# Patient Record
Sex: FEMALE | Race: WHITE | NOT HISPANIC OR LATINO | Employment: OTHER | URBAN - METROPOLITAN AREA
[De-identification: names, ages, dates, MRNs, and addresses within clinical notes are randomized per-mention and may not be internally consistent; named-entity substitution may affect disease eponyms.]

---

## 2022-09-30 ENCOUNTER — HOSPITAL ENCOUNTER (OUTPATIENT)
Dept: RADIOLOGY | Facility: HOSPITAL | Age: 62
Discharge: HOME | End: 2022-09-30
Attending: INTERNAL MEDICINE
Payer: COMMERCIAL

## 2022-09-30 DIAGNOSIS — Z12.31 BREAST CANCER SCREENING BY MAMMOGRAM: ICD-10-CM

## 2022-09-30 PROCEDURE — 77063 BREAST TOMOSYNTHESIS BI: CPT | Mod: 26,,, | Performed by: STUDENT IN AN ORGANIZED HEALTH CARE EDUCATION/TRAINING PROGRAM

## 2022-09-30 PROCEDURE — 77067 SCR MAMMO BI INCL CAD: CPT | Mod: TC

## 2022-09-30 PROCEDURE — 77067 SCR MAMMO BI INCL CAD: CPT | Mod: 26,,, | Performed by: STUDENT IN AN ORGANIZED HEALTH CARE EDUCATION/TRAINING PROGRAM

## 2022-09-30 PROCEDURE — 77063 MAMMO DIGITAL SCREENING BILAT WITH TOMO: ICD-10-PCS | Mod: 26,,, | Performed by: STUDENT IN AN ORGANIZED HEALTH CARE EDUCATION/TRAINING PROGRAM

## 2022-09-30 PROCEDURE — 77067 MAMMO DIGITAL SCREENING BILAT WITH TOMO: ICD-10-PCS | Mod: 26,,, | Performed by: STUDENT IN AN ORGANIZED HEALTH CARE EDUCATION/TRAINING PROGRAM

## 2022-11-16 ENCOUNTER — OFFICE VISIT (OUTPATIENT)
Dept: URGENT CARE | Facility: CLINIC | Age: 62
End: 2022-11-16
Payer: COMMERCIAL

## 2022-11-16 VITALS
RESPIRATION RATE: 18 BRPM | WEIGHT: 130 LBS | HEART RATE: 105 BPM | TEMPERATURE: 98 F | HEIGHT: 68 IN | SYSTOLIC BLOOD PRESSURE: 122 MMHG | BODY MASS INDEX: 19.7 KG/M2 | OXYGEN SATURATION: 98 % | DIASTOLIC BLOOD PRESSURE: 66 MMHG

## 2022-11-16 DIAGNOSIS — R51.9 ACUTE NONINTRACTABLE HEADACHE, UNSPECIFIED HEADACHE TYPE: ICD-10-CM

## 2022-11-16 DIAGNOSIS — U07.1 LAB TEST POSITIVE FOR DETECTION OF COVID-19 VIRUS: Primary | ICD-10-CM

## 2022-11-16 LAB
CTP QC/QA: YES
CTP QC/QA: YES
FLUAV AG NPH QL: NEGATIVE
FLUBV AG NPH QL: NEGATIVE
SARS-COV-2 RDRP RESP QL NAA+PROBE: POSITIVE

## 2022-11-16 PROCEDURE — 99213 OFFICE O/P EST LOW 20 MIN: CPT | Mod: S$PBB,CR,25, | Performed by: FAMILY MEDICINE

## 2022-11-16 PROCEDURE — 87635: ICD-10-PCS | Mod: QW,CR,, | Performed by: FAMILY MEDICINE

## 2022-11-16 PROCEDURE — 96372 PR INJECTION,THERAP/PROPH/DIAG2ST, IM OR SUBCUT: ICD-10-PCS | Mod: CR,S$PBB,, | Performed by: FAMILY MEDICINE

## 2022-11-16 PROCEDURE — 87635 SARS-COV-2 COVID-19 AMP PRB: CPT | Mod: QW,CR,, | Performed by: FAMILY MEDICINE

## 2022-11-16 PROCEDURE — 99213 PR OFFICE/OUTPT VISIT, EST, LEVL III, 20-29 MIN: ICD-10-PCS | Mod: S$PBB,CR,25, | Performed by: FAMILY MEDICINE

## 2022-11-16 PROCEDURE — 96372 THER/PROPH/DIAG INJ SC/IM: CPT | Mod: CR,S$PBB,, | Performed by: FAMILY MEDICINE

## 2022-11-16 PROCEDURE — 87804 POCT INFLUENZA A/B: ICD-10-PCS | Mod: QW,,, | Performed by: FAMILY MEDICINE

## 2022-11-16 PROCEDURE — 87804 INFLUENZA ASSAY W/OPTIC: CPT | Mod: QW,,, | Performed by: FAMILY MEDICINE

## 2022-11-16 RX ORDER — NIRMATRELVIR AND RITONAVIR 300-100 MG
KIT ORAL
Qty: 30 TABLET | Refills: 0 | Status: SHIPPED | OUTPATIENT
Start: 2022-11-16 | End: 2023-11-29

## 2022-11-16 RX ORDER — BETAMETHASONE SODIUM PHOSPHATE AND BETAMETHASONE ACETATE 3; 3 MG/ML; MG/ML
9 INJECTION, SUSPENSION INTRA-ARTICULAR; INTRALESIONAL; INTRAMUSCULAR; SOFT TISSUE
Status: COMPLETED | OUTPATIENT
Start: 2022-11-16 | End: 2022-11-16

## 2022-11-16 RX ORDER — KETOROLAC TROMETHAMINE 30 MG/ML
30 INJECTION, SOLUTION INTRAMUSCULAR; INTRAVENOUS
Status: COMPLETED | OUTPATIENT
Start: 2022-11-16 | End: 2022-11-16

## 2022-11-16 RX ORDER — BUTALBITAL, ACETAMINOPHEN AND CAFFEINE 50; 325; 40 MG/1; MG/1; MG/1
1 TABLET ORAL EVERY 6 HOURS PRN
Qty: 20 TABLET | Refills: 0 | Status: SHIPPED | OUTPATIENT
Start: 2022-11-16 | End: 2022-11-21

## 2022-11-16 RX ADMIN — BETAMETHASONE SODIUM PHOSPHATE AND BETAMETHASONE ACETATE 9 MG: 3; 3 INJECTION, SUSPENSION INTRA-ARTICULAR; INTRALESIONAL; INTRAMUSCULAR; SOFT TISSUE at 09:11

## 2022-11-16 RX ADMIN — KETOROLAC TROMETHAMINE 30 MG: 30 INJECTION, SOLUTION INTRAMUSCULAR; INTRAVENOUS at 09:11

## 2022-11-16 NOTE — PROGRESS NOTES
Subjective:       Patient ID: Yasmin Andrade is a 62 y.o. female.    Vitals:  vitals were not taken for this visit.     Chief Complaint: No chief complaint on file.    Cough, fever 102, headache, back pain, patients O2 was 89 last night, and sore throat. Started yesterday  ROS    Objective:      Physical Exam      Assessment:       No diagnosis found.      Plan:         There are no diagnoses linked to this encounter.

## 2022-11-16 NOTE — PROGRESS NOTES
Patient ID: [id number]     Chief Complaint: upper respiratory tract infection symptoms    History of Present Illness:     Yasmin Andrade is a 62 y.o. female  who presents today for symptoms of Cough  The history of asthma comes in today with a 1 day history of a cough, headache, and increased use of her albuterol rescue inhaler.  She reports that, when she is well, she still uses the rescue inhaler twice a day.  She has used it several more times since yesterday.  She also had an albuterol nebulizer this morning because she reports that her saturations were 89%.  She presents today with her daughter.    During exam, she is not in acute distress, she does not have any increased work of breathing.    Pt denies experiencing any fevers, chills, nausea, vomiting, difficulty breathing, dysphagia, or neck stiffness.    Past Medical History:     ----------------------------  Asthma  Hypothyroidism  Migraine     Past Surgical History:   Procedure Laterality Date     SECTION      right shoulder sx      sinus sx         Review of patient's allergies indicates:   Allergen Reactions    Penicillins Hives    Levofloxacin     Penicillanic sulfone bl beta-lactamase inhibitors Edema       Outpatient Medications Marked as Taking for the 22 encounter (Office Visit) [place] [facility] [facility] [place]ication Sig Dispense Refill    BREO ELLIPTA 200-25 mcg/dose DsDv diskus inhaler INHALE 1 PUFF BY MOUTH ONCE A DAY      levothyroxine (SYNTHROID) 50 MCG tablet Take 50 mcg by mouth once daily.      montelukast (SINGULAIR) 10 mg tablet Take 10 mg by mouth once daily.      SPIRIVA RESPIMAT 1.25 mcg/actuation inhaler INHALE TWO PUFFS ONCE A DAY      topiramate (TOPAMAX ORAL) Topamax Take No date recorded No form recorded No frequency recorded No route recorded No set duration recorded No set duration amount recorded active No dosage strength recorded No dosage strength units of measure recorded      topiramate (TOPAMAX) 50 MG  "tablet Take 50 mg by mouth once daily.       Current Facility-Administered Medications for the 11/16/22 encounter (Office Visit) with [facility], [facility] [place]   Medication Dose Route Frequency Provider Last Rate Last Admin    betamethasone acetate-betamethasone sodium phosphate injection 9 mg  9 mg Intramuscular 1 time in Clinic/HOD Castro Torres MD        ketorolac injection 30 mg  30 mg Intramuscular 1 time in Clinic/HOD Castro Torres MD           Social History     Socioeconomic History    Marital status:    Tobacco Use    Smoking status: Never    Smokeless tobacco: Never   Substance and Sexual Activity    Alcohol use: Never        Family History   Family history unknown: Yes        Subjective:     Review of Systems   Constitutional:  Positive for fever. Negative for chills, malaise/fatigue and weight loss.   HENT:  Positive for sore throat. Negative for congestion.    Respiratory:  Positive for cough. Negative for sputum production, shortness of breath, wheezing and stridor.    Gastrointestinal:  Negative for abdominal pain, diarrhea, nausea and vomiting.   Musculoskeletal:  Positive for back pain. Negative for myalgias and neck pain.   Neurological:  Positive for headaches.     Objective:     /66   Pulse 105   Temp 98.4 °F (36.9 °C) (Oral)   Resp 18   Ht 5' 8" (1.727 m)   Wt 59 kg (130 lb)   SpO2 98%   BMI 19.77 kg/m²     Physical Exam  Vitals and nursing note reviewed.   Constitutional:       General: She is not in acute distress.     Appearance: Normal appearance. She is normal weight. She is not ill-appearing or toxic-appearing.   HENT:      Head: Normocephalic and atraumatic.      Right Ear: Tympanic membrane and ear canal normal.      Left Ear: Tympanic membrane and ear canal normal.      Nose: Nose normal. No congestion or rhinorrhea.      Comments: There are several red non blanchable spots on her soft palate.  She reports that she is been told this before.  Recommended she " follow-up with her PCP or ENT about this     Mouth/Throat:      Pharynx: No oropharyngeal exudate or posterior oropharyngeal erythema.   Eyes:      General: No scleral icterus.        Right eye: No discharge.         Left eye: No discharge.      Extraocular Movements: Extraocular movements intact.      Conjunctiva/sclera: Conjunctivae normal.   Cardiovascular:      Rate and Rhythm: Normal rate and regular rhythm.      Heart sounds: Normal heart sounds. No murmur heard.    No friction rub. No gallop.   Pulmonary:      Effort: Pulmonary effort is normal. No respiratory distress.      Breath sounds: No stridor. No wheezing, rhonchi or rales.      Comments: Decreased lung sounds all fields.  No increased work of breathing, no wheezing,  Musculoskeletal:      Cervical back: Normal range of motion. No rigidity or tenderness.   Lymphadenopathy:      Cervical: No cervical adenopathy.   Neurological:      Mental Status: She is alert.   Psychiatric:         Mood and Affect: Mood normal.         Behavior: Behavior normal.       Assessment & Plan:       ICD-10-CM ICD-9-CM   1. Lab test positive for detection of COVID-19 virus  U07.1 079.89   2. Acute nonintractable headache, unspecified headache type  R51.9 784.0      We had a long discussion about my concern for her COVID diagnosis in the setting of asthma.  Discussed with her and her daughter to have a hair trigger to go to the emergency room if she has increased work of breathing that does not respond to reasonable albuterol use.  They both voiced understanding.  We will get a chest x-ray for a baseline in the event her condition worsens.  She asked for something for the headache addition to the Toradol shot so we will give Fioricet.  She already has an immunologist/allergist who is working on her asthma meds.  She will give that physician on call to see they have any further recommendations.     1. Lab test positive for detection of COVID-19 virus  -     POCT Influenza  A/B  -     POCT COVID-19 Rapid Screening  -     betamethasone acetate-betamethasone sodium phosphate injection 9 mg  -     X-Ray Chest PA And Lateral; Future; Expected date: 11/16/2022  -     nirmatrelvir-ritonavir (PAXLOVID, EUA,) 300 mg (150 mg x 2)-100 mg copackaged tablets (EUA); Take 3 tablets by mouth 2 (two) times daily. Each dose contains 2 nirmatrelvir (pink tablets) and 1 ritonavir (white tablet). Take all 3 tablets together  Dispense: 30 tablet; Refill: 0    2. Acute nonintractable headache, unspecified headache type  -     betamethasone acetate-betamethasone sodium phosphate injection 9 mg  -     ketorolac injection 30 mg  -     butalbital-acetaminophen-caffeine -40 mg (FIORICET, ESGIC) -40 mg per tablet; Take 1 tablet by mouth every 6 (six) hours as needed for Headaches.  Dispense: 20 tablet; Refill: 0

## 2023-02-22 ENCOUNTER — OFFICE VISIT (OUTPATIENT)
Dept: URGENT CARE | Facility: CLINIC | Age: 63
End: 2023-02-22
Payer: COMMERCIAL

## 2023-02-22 VITALS
DIASTOLIC BLOOD PRESSURE: 87 MMHG | TEMPERATURE: 98 F | HEIGHT: 68 IN | RESPIRATION RATE: 18 BRPM | WEIGHT: 130 LBS | OXYGEN SATURATION: 97 % | HEART RATE: 104 BPM | BODY MASS INDEX: 19.7 KG/M2 | SYSTOLIC BLOOD PRESSURE: 142 MMHG

## 2023-02-22 DIAGNOSIS — R05.9 COUGH, UNSPECIFIED TYPE: Primary | ICD-10-CM

## 2023-02-22 PROCEDURE — 99213 PR OFFICE/OUTPT VISIT, EST, LEVL III, 20-29 MIN: ICD-10-PCS | Mod: ,,, | Performed by: PHYSICIAN ASSISTANT

## 2023-02-22 PROCEDURE — 99213 OFFICE O/P EST LOW 20 MIN: CPT | Mod: ,,, | Performed by: PHYSICIAN ASSISTANT

## 2023-02-22 RX ORDER — DOXYCYCLINE 100 MG/1
100 CAPSULE ORAL EVERY 12 HOURS
Qty: 20 CAPSULE | Refills: 0 | Status: SHIPPED | OUTPATIENT
Start: 2023-02-22 | End: 2023-03-04

## 2023-02-22 RX ORDER — PREDNISONE 20 MG/1
20 TABLET ORAL 2 TIMES DAILY
Qty: 10 TABLET | Refills: 0 | Status: SHIPPED | OUTPATIENT
Start: 2023-02-22 | End: 2023-02-27

## 2023-02-23 NOTE — PROGRESS NOTES
"Subjective:       Patient ID: Yasmin Andrade is a 62 y.o. female.    Vitals:  height is 5' 8" (1.727 m) and weight is 59 kg (130 lb). Her temperature is 97.7 °F (36.5 °C). Her blood pressure is 142/87 (abnormal) and her pulse is 104. Her respiration is 18 and oxygen saturation is 97%.     Chief Complaint: Cough (Cough, congestion,  sore throat x2 weeks. Mucinex and Tylenol taken 11am, no relief.)    Cough, congestion, sore throat x2 weeks. Mucinex and Tylenol taken 11am, no relief.  Patient does have a history asthma and has been using breathing treatments to alleviate wheezing.  Denies any fever.    Cough    Respiratory:  Positive for cough.      Objective:      Physical Exam   Constitutional: She is oriented to person, place, and time. She appears well-developed. She is cooperative.  Non-toxic appearance. She does not appear ill. No distress.   HENT:   Head: Normocephalic and atraumatic.   Ears:   Right Ear: Hearing, tympanic membrane, external ear and ear canal normal.   Left Ear: Hearing, tympanic membrane, external ear and ear canal normal.   Nose: Nose normal. No nasal deformity. No epistaxis.   Mouth/Throat: Uvula is midline, oropharynx is clear and moist and mucous membranes are normal. No trismus in the jaw. Normal dentition. No uvula swelling. No oropharyngeal exudate, posterior oropharyngeal edema or posterior oropharyngeal erythema.   Eyes: Conjunctivae and lids are normal. No scleral icterus.   Neck: Trachea normal and phonation normal. Neck supple. No edema present. No erythema present. No neck rigidity present.   Cardiovascular: Normal rate, regular rhythm, normal heart sounds and normal pulses.   Pulmonary/Chest: Effort normal and breath sounds normal. No respiratory distress. She has no decreased breath sounds. She has no rhonchi.   Abdominal: Normal appearance.   Musculoskeletal: Normal range of motion.         General: No deformity. Normal range of motion.   Neurological: She is alert and oriented to " person, place, and time. She exhibits normal muscle tone. Coordination normal.   Skin: Skin is warm, dry, intact, not diaphoretic and not pale.   Psychiatric: Her speech is normal and behavior is normal. Judgment and thought content normal.   Nursing note and vitals reviewed.             Previous History      Review of patient's allergies indicates:   Allergen Reactions    Penicillins Hives    Levofloxacin     Penicillanic sulfone bl beta-lactamase inhibitors Edema       Past Medical History:   Diagnosis Date    Asthma     Hypothyroidism     Migraine      Current Outpatient Medications   Medication Instructions    azithromycin (ZITHROMAX) 500 mg, Oral, Daily    BREO ELLIPTA 200-25 mcg/dose DsDv diskus inhaler INHALE 1 PUFF BY MOUTH ONCE A DAY    doxycycline (MONODOX) 100 mg, Oral, Every 12 hours    FASENRA PEN 30 mg/mL AtIn Subcutaneous    levothyroxine (SYNTHROID) 50 mcg, Oral, Daily    montelukast (SINGULAIR) 10 mg, Oral, Daily    nirmatrelvir-ritonavir (PAXLOVID, EUA,) 300 mg (150 mg x 2)-100 mg copackaged tablets (EUA) Take 3 tablets by mouth 2 (two) times daily. Each dose contains 2 nirmatrelvir (pink tablets) and 1 ritonavir (white tablet). Take all 3 tablets together    predniSONE (DELTASONE) 20 mg, Oral, 2 times daily    pyrilamine-chlophedianoL 12.5-12.5 mg/5 mL Liqd 10 mLs, Oral, 3 times daily    SPIRIVA RESPIMAT 1.25 mcg/actuation inhaler INHALE TWO PUFFS ONCE A DAY    topiramate (TOPAMAX ORAL) Topamax Take No date recorded No form recorded No frequency recorded No route recorded No set duration recorded No set duration amount recorded active No dosage strength recorded No dosage strength units of measure recorded    topiramate (TOPAMAX) 50 mg, Oral, Daily     Past Surgical History:   Procedure Laterality Date     SECTION      right shoulder sx      sinus sx       Family History   Problem Relation Age of Onset    No Known Problems Mother     No Known Problems Father     No Known Problems Sister   "   No Known Problems Brother        Social History     Tobacco Use    Smoking status: Never    Smokeless tobacco: Never   Substance Use Topics    Alcohol use: Never    Drug use: Never        Physical Exam      Vital Signs Reviewed   BP (!) 142/87   Pulse 104   Temp 97.7 °F (36.5 °C)   Resp 18   Ht 5' 8" (1.727 m)   Wt 59 kg (130 lb)   SpO2 97%   BMI 19.77 kg/m²        Procedures    Procedures     Labs     Results for orders placed or performed in visit on 11/16/22   POCT Influenza A/B   Result Value Ref Range    Rapid Influenza A Ag Negative Negative    Rapid Influenza B Ag Negative Negative     Acceptable Yes    POCT COVID-19 Rapid Screening   Result Value Ref Range    POC Rapid COVID Positive (A) Negative     Acceptable Yes      Assessment:       1. Cough, unspecified type          Plan:         Cough, unspecified type    Other orders  -     doxycycline (MONODOX) 100 MG capsule; Take 1 capsule (100 mg total) by mouth every 12 (twelve) hours. for 10 days  Dispense: 20 capsule; Refill: 0  -     predniSONE (DELTASONE) 20 MG tablet; Take 1 tablet (20 mg total) by mouth 2 (two) times daily. for 5 days  Dispense: 10 tablet; Refill: 0  -     pyrilamine-chlophedianoL 12.5-12.5 mg/5 mL Liqd; Take 10 mLs by mouth 3 (three) times daily.  Dispense: 180 mL; Refill: 0    Drink plenty of fluids.     Get plenty of rest.     Tylenol or Motrin as needed.     Go to the ER with any significant change or worsening of symptoms.     Follow up with your primary care doctor.                    "

## 2023-05-03 ENCOUNTER — LAB VISIT (OUTPATIENT)
Dept: LAB | Facility: HOSPITAL | Age: 63
End: 2023-05-03
Attending: INTERNAL MEDICINE
Payer: COMMERCIAL

## 2023-05-03 DIAGNOSIS — R63.4 LOSS OF WEIGHT: Primary | ICD-10-CM

## 2023-05-03 DIAGNOSIS — K31.89 GASTROPTOSIS: ICD-10-CM

## 2023-05-03 LAB — IGA SERPL-MCNC: 175 MG/DL (ref 69–517)

## 2023-05-03 PROCEDURE — 36415 COLL VENOUS BLD VENIPUNCTURE: CPT

## 2023-05-03 PROCEDURE — 83516 IMMUNOASSAY NONANTIBODY: CPT

## 2023-05-03 PROCEDURE — 82784 ASSAY IGA/IGD/IGG/IGM EACH: CPT

## 2023-05-03 PROCEDURE — 86364 TISS TRNSGLTMNASE EA IG CLAS: CPT

## 2023-05-05 LAB
ELIA CELIKEY IGA (TTG IGA): NEGATIVE
ELIA CELIKEY IGG (TTG IGG): NEGATIVE
GLIADIN IGA DEAMINATED (OHS): NEGATIVE UNIT/ML
GLIADIN IGG DEAMINATED (OHS): NEGATIVE

## 2023-10-02 ENCOUNTER — OFFICE VISIT (OUTPATIENT)
Dept: URGENT CARE | Facility: CLINIC | Age: 63
End: 2023-10-02
Payer: COMMERCIAL

## 2023-10-02 VITALS
DIASTOLIC BLOOD PRESSURE: 87 MMHG | OXYGEN SATURATION: 98 % | BODY MASS INDEX: 20.92 KG/M2 | WEIGHT: 138 LBS | RESPIRATION RATE: 18 BRPM | SYSTOLIC BLOOD PRESSURE: 147 MMHG | HEIGHT: 68 IN | TEMPERATURE: 98 F | HEART RATE: 86 BPM

## 2023-10-02 DIAGNOSIS — N30.00 ACUTE CYSTITIS WITHOUT HEMATURIA: ICD-10-CM

## 2023-10-02 DIAGNOSIS — R05.9 COUGH, UNSPECIFIED TYPE: Primary | ICD-10-CM

## 2023-10-02 DIAGNOSIS — J18.9 PNEUMONIA OF RIGHT UPPER LOBE DUE TO INFECTIOUS ORGANISM: ICD-10-CM

## 2023-10-02 LAB
BILIRUB UR QL STRIP: NEGATIVE
CTP QC/QA: YES
CTP QC/QA: YES
GLUCOSE UR QL STRIP: NEGATIVE
KETONES UR QL STRIP: NEGATIVE
LEUKOCYTE ESTERASE UR QL STRIP: POSITIVE
PH, POC UA: 5
POC BLOOD, URINE: NEGATIVE
POC MOLECULAR INFLUENZA A AGN: NEGATIVE
POC MOLECULAR INFLUENZA B AGN: NEGATIVE
POC NITRATES, URINE: NEGATIVE
PROT UR QL STRIP: NEGATIVE
SARS-COV-2 RDRP RESP QL NAA+PROBE: NEGATIVE
SP GR UR STRIP: 1.02 (ref 1–1.03)
UROBILINOGEN UR STRIP-ACNC: 0.1 (ref 0.1–1.1)

## 2023-10-02 PROCEDURE — 87502 POCT INFLUENZA A/B MOLECULAR: ICD-10-PCS | Mod: QW,,,

## 2023-10-02 PROCEDURE — 87635 SARS-COV-2 COVID-19 AMP PRB: CPT | Mod: QW,,,

## 2023-10-02 PROCEDURE — 81003 POCT URINALYSIS, DIPSTICK, AUTOMATED, W/O SCOPE: ICD-10-PCS | Mod: QW,,,

## 2023-10-02 PROCEDURE — 87502 INFLUENZA DNA AMP PROBE: CPT | Mod: QW,,,

## 2023-10-02 PROCEDURE — 87088 URINE BACTERIA CULTURE: CPT

## 2023-10-02 PROCEDURE — 87635: ICD-10-PCS | Mod: QW,,,

## 2023-10-02 PROCEDURE — 99214 OFFICE O/P EST MOD 30 MIN: CPT | Mod: ,,,

## 2023-10-02 PROCEDURE — 81003 URINALYSIS AUTO W/O SCOPE: CPT | Mod: QW,,,

## 2023-10-02 PROCEDURE — 99214 PR OFFICE/OUTPT VISIT, EST, LEVL IV, 30-39 MIN: ICD-10-PCS | Mod: ,,,

## 2023-10-02 RX ORDER — BENZONATATE 100 MG/1
100 CAPSULE ORAL 3 TIMES DAILY PRN
Qty: 15 CAPSULE | Refills: 0 | Status: SHIPPED | OUTPATIENT
Start: 2023-10-02 | End: 2023-10-07

## 2023-10-02 RX ORDER — LISINOPRIL 10 MG/1
10 TABLET ORAL
COMMUNITY
Start: 2023-07-10 | End: 2023-11-29

## 2023-10-02 RX ORDER — DOXYCYCLINE 100 MG/1
100 CAPSULE ORAL EVERY 12 HOURS
Qty: 14 CAPSULE | Refills: 0 | Status: SHIPPED | OUTPATIENT
Start: 2023-10-02 | End: 2023-10-09

## 2023-10-02 RX ORDER — ONDANSETRON 4 MG/1
4 TABLET, ORALLY DISINTEGRATING ORAL EVERY 8 HOURS PRN
Qty: 9 TABLET | Refills: 0 | Status: SHIPPED | OUTPATIENT
Start: 2023-10-02 | End: 2023-10-05

## 2023-10-02 RX ORDER — DOXYCYCLINE 100 MG/1
100 CAPSULE ORAL EVERY 12 HOURS
Qty: 14 CAPSULE | Refills: 0 | Status: SHIPPED | OUTPATIENT
Start: 2023-10-02 | End: 2023-10-02

## 2023-10-02 NOTE — PROGRESS NOTES
"Subjective:      Patient ID: Yasmin Andrade is a 62 y.o. female.    Vitals:  height is 5' 8" (1.727 m) and weight is 62.6 kg (138 lb). Her temperature is 98 °F (36.7 °C). Her blood pressure is 147/87 (abnormal) and her pulse is 86. Her respiration is 18 and oxygen saturation is 98%.     Chief Complaint: Cough (Fever(100.9* x last night)BA, vomiting, dry Cough, nausea, NC, HA, pressure lower stomach x4d tylenol, breathing treatments mild)    A 61 y/o female presents to the clinic with c/o dry cough, fever tmax 101, body aches, nausea and vomiting, and pelvic pressure x 3 days. She denies any urinary frequency, urgency or dysuria. She does have a hx of asthma but denies any wheezing, chest tightness or shortness of breath. She also denies any sore throat, diarrhea, rash, difficulty swallowing, neck stiffness, or changes in output.         Cough  Associated symptoms include chills and a fever. Pertinent negatives include no ear pain, hemoptysis, sore throat, shortness of breath or wheezing.       Constitution: Positive for chills, fatigue and fever.   HENT:  Positive for congestion and sinus pressure. Negative for ear pain, sore throat, trouble swallowing and voice change.    Respiratory:  Positive for cough, sputum production and asthma. Negative for bloody sputum, shortness of breath and wheezing.    Gastrointestinal:  Positive for nausea and vomiting. Negative for abdominal pain, constipation, diarrhea and bright red blood in stool.   Genitourinary:  Positive for pelvic pain. Negative for dysuria, frequency, urgency, urine decreased and flank pain.   Allergic/Immunologic: Positive for asthma.      Objective:     Physical Exam   Constitutional: She is oriented to person, place, and time. She appears well-developed. She is cooperative.  Non-toxic appearance. She does not appear ill. No distress.   HENT:   Head: Normocephalic and atraumatic.   Ears:   Right Ear: Hearing, tympanic membrane and external ear normal. "   Left Ear: Hearing, tympanic membrane and external ear normal.   Nose: Congestion (clear pnd) present.   Mouth/Throat: Mucous membranes are normal. Mucous membranes are moist. Posterior oropharyngeal erythema present. Oropharynx is clear.   Eyes: Conjunctivae and lids are normal.   Neck: Trachea normal and phonation normal. Neck supple. No edema present. No erythema present. No neck rigidity present.   Cardiovascular: Normal rate.   Pulmonary/Chest: Effort normal and breath sounds normal. No stridor. No respiratory distress. She has no decreased breath sounds. She has no wheezes. She has no rhonchi. She has no rales.   Abdominal: Normal appearance and bowel sounds are normal. Soft. flat abdomen There is abdominal tenderness in the suprapubic area. There is no rebound and no guarding.   Neurological: She is alert and oriented to person, place, and time. She exhibits normal muscle tone.   Skin: Skin is warm, dry, intact, not diaphoretic and no rash. Capillary refill takes less than 2 seconds.   Psychiatric: Her speech is normal and behavior is normal. Mood normal.   Nursing note and vitals reviewed.      Assessment:     1. Cough, unspecified type    2. Acute cystitis without hematuria    3. Pneumonia of right upper lobe due to infectious organism        Plan:       Cough, unspecified type  -     Cancel: POCT Strep A, Molecular  -     POCT COVID-19 Rapid Screening  -     POCT Influenza A/B MOLECULAR  -     X-Ray Chest PA And Lateral  -     POCT Urinalysis, Dipstick, Automated, W/O Scope    Acute cystitis without hematuria  -     Urine culture    Pneumonia of right upper lobe due to infectious organism    Other orders  -     Discontinue: doxycycline (MONODOX) 100 MG capsule; Take 1 capsule (100 mg total) by mouth every 12 (twelve) hours. for 7 days  Dispense: 14 capsule; Refill: 0  -     ondansetron (ZOFRAN-ODT) 4 MG TbDL; Take 1 tablet (4 mg total) by mouth every 8 (eight) hours as needed (nausea).  Dispense: 9  tablet; Refill: 0  -     benzonatate (TESSALON) 100 MG capsule; Take 1 capsule (100 mg total) by mouth 3 (three) times daily as needed for Cough.  Dispense: 15 capsule; Refill: 0    Covid, and flu negative   U/a: positive for 75 leukocytes  Chest xray:Right upper lung lobe mild infiltrates.   Orthostatic vital signs negative     Cough   Use you inhaler/nebulizer as directed for wheezing, coughing spells, or chest congestion   Start the antibiotic today take to completion   Start increasing your fluid intake with small sips of an oral re-hydration solution like Pedialyte or Gatorade, if you are unable to tolerate this you need to go to the ER for to rule out dehydration   STRICT ER precautions discussed, if you do not improve or worsen at at all including shortness of breath, wheezing, weakness or lethargy continue fever or vomiting go to the ER immediately  Call your pulmonologist for further recommendations on steroid use.    Start taking an allergy pill daily such as claritin, zyrtec, allegrea or xyzal. Also start using a nasal steroid spray such as flonase or nasacort daily. If you are not being treated for high blood pressure, you can also take decongestant such as sudafed as needed. They can be purchased over the counter. If oral steroids were prescribed, start them tomorrow morning. Monitor for fever. Take tylenol/acetaminophen or ibuprofen as needed. Rest and hydrate. If symptoms persist or worsen, return to clinic or seek medical attention immediately.     UTI   We will culture urine and call you with the results when they become available.  Monitor for fever. Always wipe from front to back. Avoid scented soaps or bubble baths. Increase your Hydration. Avoid sugary drinks, or caffeine as they can irritate your bladder.  If your symptoms persist or worsen or you develop fever back pain or belly pain return to clinic or seek medical attention immediately

## 2023-10-02 NOTE — PATIENT INSTRUCTIONS
Cough   Use you inhaler/nebulizer as directed for wheezing, coughing spells, or chest congestion   Start the antibiotic today take to completion   Start increasing your fluid intake with small sips of an oral re-hydration solution like Pedialyte or Gatorade, if you are unable to tolerate this you need to go to the ER for possible fluids  STRICT ER precautions discussed, if you do not improve or worsen at at all including shortness of breath, wheezing, weakness or lethargy continue fever or vomiting go to the ER immediately  Call your pulmonologist for further recommendations on steroid use.    Start taking an allergy pill daily such as claritin, zyrtec, allegrea or xyzal. Also start using a nasal steroid spray such as flonase or nasacort daily. If you are not being treated for high blood pressure, you can also take decongestant such as sudafed as needed. They can be purchased over the counter. If oral steroids were prescribed, start them tomorrow morning. Monitor for fever. Take tylenol/acetaminophen or ibuprofen as needed. Rest and hydrate. If symptoms persist or worsen, return to clinic or seek medical attention immediately.     UTI   We will culture urine and call you with the results when they become available.  Monitor for fever. Always wipe from front to back. Avoid scented soaps or bubble baths. Increase your Hydration. Avoid sugary drinks, or caffeine as they can irritate your bladder.  If your symptoms persist or worsen or you develop fever back pain or belly pain return to clinic or seek medical attention immediately

## 2023-10-03 ENCOUNTER — TELEPHONE (OUTPATIENT)
Dept: URGENT CARE | Facility: CLINIC | Age: 63
End: 2023-10-03
Payer: COMMERCIAL

## 2023-10-03 NOTE — TELEPHONE ENCOUNTER
Called to check on the status of the patient, she reports feeling slightly better but is not any worse. Instructed to follow up with her pcp and return to clinic as needed.

## 2023-10-04 LAB — BACTERIA UR CULT: NORMAL

## 2023-11-07 DIAGNOSIS — R79.89 ABNORMAL CBC: Primary | ICD-10-CM

## 2023-11-10 DIAGNOSIS — R13.14 DYSPHAGIA, PHARYNGOESOPHAGEAL PHASE: Primary | ICD-10-CM

## 2023-11-10 DIAGNOSIS — Z79.2 ENCOUNTER FOR LONG-TERM (CURRENT) USE OF ANTIBIOTICS: ICD-10-CM

## 2023-11-10 DIAGNOSIS — R11.0 NAUSEA: ICD-10-CM

## 2023-11-10 DIAGNOSIS — K86.2 CYST OF PANCREAS: ICD-10-CM

## 2023-11-10 DIAGNOSIS — K21.00 GASTRO-ESOPHAGEAL REFLUX DISEASE WITH ESOPHAGITIS: ICD-10-CM

## 2023-11-10 DIAGNOSIS — R07.9 CHEST PAIN, UNSPECIFIED: ICD-10-CM

## 2023-11-29 ENCOUNTER — OFFICE VISIT (OUTPATIENT)
Dept: HEMATOLOGY/ONCOLOGY | Facility: CLINIC | Age: 63
End: 2023-11-29
Payer: COMMERCIAL

## 2023-11-29 VITALS
OXYGEN SATURATION: 100 % | TEMPERATURE: 98 F | WEIGHT: 134.13 LBS | BODY MASS INDEX: 20.33 KG/M2 | HEART RATE: 67 BPM | SYSTOLIC BLOOD PRESSURE: 142 MMHG | RESPIRATION RATE: 18 BRPM | DIASTOLIC BLOOD PRESSURE: 87 MMHG | HEIGHT: 68 IN

## 2023-11-29 DIAGNOSIS — R79.89 ABNORMAL CBC: ICD-10-CM

## 2023-11-29 DIAGNOSIS — D72.10 EOSINOPHILIA, UNSPECIFIED TYPE: Primary | ICD-10-CM

## 2023-11-29 LAB
ALBUMIN SERPL-MCNC: 4.4 G/DL (ref 3.4–4.8)
ALBUMIN/GLOB SERPL: 1.8 RATIO (ref 1.1–2)
ALP SERPL-CCNC: 72 UNIT/L (ref 40–150)
ALT SERPL-CCNC: 15 UNIT/L (ref 0–55)
AST SERPL-CCNC: 15 UNIT/L (ref 5–34)
BASOPHILS # BLD AUTO: 0.04 X10(3)/MCL
BASOPHILS NFR BLD AUTO: 0.6 %
BILIRUB SERPL-MCNC: 0.5 MG/DL
BUN SERPL-MCNC: 9 MG/DL (ref 9.8–20.1)
CALCIUM SERPL-MCNC: 9.3 MG/DL (ref 8.4–10.2)
CHLORIDE SERPL-SCNC: 106 MMOL/L (ref 98–107)
CO2 SERPL-SCNC: 27 MMOL/L (ref 23–31)
CREAT SERPL-MCNC: 0.7 MG/DL (ref 0.55–1.02)
EOSINOPHIL # BLD AUTO: 0.78 X10(3)/MCL (ref 0–0.9)
EOSINOPHIL NFR BLD AUTO: 12.2 %
ERYTHROCYTE [DISTWIDTH] IN BLOOD BY AUTOMATED COUNT: 13.3 % (ref 11.5–17)
GFR SERPLBLD CREATININE-BSD FMLA CKD-EPI: >60 MLS/MIN/1.73/M2
GLOBULIN SER-MCNC: 2.4 GM/DL (ref 2.4–3.5)
GLUCOSE SERPL-MCNC: 94 MG/DL (ref 82–115)
HCT VFR BLD AUTO: 38.1 % (ref 37–47)
HGB BLD-MCNC: 11.7 G/DL (ref 12–16)
IMM GRANULOCYTES # BLD AUTO: 0 X10(3)/MCL (ref 0–0.04)
IMM GRANULOCYTES NFR BLD AUTO: 0 %
LYMPHOCYTES # BLD AUTO: 2.19 X10(3)/MCL (ref 0.6–4.6)
LYMPHOCYTES NFR BLD AUTO: 34.4 %
MCH RBC QN AUTO: 29.5 PG (ref 27–31)
MCHC RBC AUTO-ENTMCNC: 30.7 G/DL (ref 33–36)
MCV RBC AUTO: 96.2 FL (ref 80–94)
MONOCYTES # BLD AUTO: 0.43 X10(3)/MCL (ref 0.1–1.3)
MONOCYTES NFR BLD AUTO: 6.8 %
NEUTROPHILS # BLD AUTO: 2.93 X10(3)/MCL (ref 2.1–9.2)
NEUTROPHILS NFR BLD AUTO: 46 %
PLATELET # BLD AUTO: 288 X10(3)/MCL (ref 130–400)
PMV BLD AUTO: 8.4 FL (ref 7.4–10.4)
POTASSIUM SERPL-SCNC: 3.7 MMOL/L (ref 3.5–5.1)
PROT SERPL-MCNC: 6.8 GM/DL (ref 5.8–7.6)
RBC # BLD AUTO: 3.96 X10(6)/MCL (ref 4.2–5.4)
RHEUMATOID FACT SERPL-ACNC: 21 IU/ML
SODIUM SERPL-SCNC: 142 MMOL/L (ref 136–145)
VIT B12 SERPL-MCNC: 790 PG/ML (ref 213–816)
WBC # SPEC AUTO: 6.37 X10(3)/MCL (ref 4.5–11.5)

## 2023-11-29 PROCEDURE — 86431 RHEUMATOID FACTOR QUANT: CPT | Performed by: INTERNAL MEDICINE

## 2023-11-29 PROCEDURE — 99999 PR PBB SHADOW E&M-EST. PATIENT-LVL IV: ICD-10-PCS | Mod: PBBFAC,,, | Performed by: INTERNAL MEDICINE

## 2023-11-29 PROCEDURE — 3077F PR MOST RECENT SYSTOLIC BLOOD PRESSURE >= 140 MM HG: ICD-10-PCS | Mod: CPTII,S$GLB,, | Performed by: INTERNAL MEDICINE

## 2023-11-29 PROCEDURE — 3079F PR MOST RECENT DIASTOLIC BLOOD PRESSURE 80-89 MM HG: ICD-10-PCS | Mod: CPTII,S$GLB,, | Performed by: INTERNAL MEDICINE

## 2023-11-29 PROCEDURE — 85025 COMPLETE CBC W/AUTO DIFF WBC: CPT | Performed by: INTERNAL MEDICINE

## 2023-11-29 PROCEDURE — 83520 IMMUNOASSAY QUANT NOS NONAB: CPT | Performed by: INTERNAL MEDICINE

## 2023-11-29 PROCEDURE — 3044F PR MOST RECENT HEMOGLOBIN A1C LEVEL <7.0%: ICD-10-PCS | Mod: CPTII,S$GLB,, | Performed by: INTERNAL MEDICINE

## 2023-11-29 PROCEDURE — 1160F RVW MEDS BY RX/DR IN RCRD: CPT | Mod: CPTII,S$GLB,, | Performed by: INTERNAL MEDICINE

## 2023-11-29 PROCEDURE — 3077F SYST BP >= 140 MM HG: CPT | Mod: CPTII,S$GLB,, | Performed by: INTERNAL MEDICINE

## 2023-11-29 PROCEDURE — 3044F HG A1C LEVEL LT 7.0%: CPT | Mod: CPTII,S$GLB,, | Performed by: INTERNAL MEDICINE

## 2023-11-29 PROCEDURE — 86431 RHEUMATOID FACTOR QUANT: CPT | Mod: 91 | Performed by: INTERNAL MEDICINE

## 2023-11-29 PROCEDURE — 1160F PR REVIEW ALL MEDS BY PRESCRIBER/CLIN PHARMACIST DOCUMENTED: ICD-10-PCS | Mod: CPTII,S$GLB,, | Performed by: INTERNAL MEDICINE

## 2023-11-29 PROCEDURE — 82787 IGG 1 2 3 OR 4 EACH: CPT | Performed by: INTERNAL MEDICINE

## 2023-11-29 PROCEDURE — 82607 VITAMIN B-12: CPT | Performed by: INTERNAL MEDICINE

## 2023-11-29 PROCEDURE — 86039 ANTINUCLEAR ANTIBODIES (ANA): CPT | Performed by: INTERNAL MEDICINE

## 2023-11-29 PROCEDURE — 80053 COMPREHEN METABOLIC PANEL: CPT | Performed by: INTERNAL MEDICINE

## 2023-11-29 PROCEDURE — 3079F DIAST BP 80-89 MM HG: CPT | Mod: CPTII,S$GLB,, | Performed by: INTERNAL MEDICINE

## 2023-11-29 PROCEDURE — 4010F ACE/ARB THERAPY RXD/TAKEN: CPT | Mod: CPTII,S$GLB,, | Performed by: INTERNAL MEDICINE

## 2023-11-29 PROCEDURE — 3008F PR BODY MASS INDEX (BMI) DOCUMENTED: ICD-10-PCS | Mod: CPTII,S$GLB,, | Performed by: INTERNAL MEDICINE

## 2023-11-29 PROCEDURE — 1159F PR MEDICATION LIST DOCUMENTED IN MEDICAL RECORD: ICD-10-PCS | Mod: CPTII,S$GLB,, | Performed by: INTERNAL MEDICINE

## 2023-11-29 PROCEDURE — 1159F MED LIST DOCD IN RCRD: CPT | Mod: CPTII,S$GLB,, | Performed by: INTERNAL MEDICINE

## 2023-11-29 PROCEDURE — 99205 OFFICE O/P NEW HI 60 MIN: CPT | Mod: S$GLB,,, | Performed by: INTERNAL MEDICINE

## 2023-11-29 PROCEDURE — 99205 PR OFFICE/OUTPT VISIT, NEW, LEVL V, 60-74 MIN: ICD-10-PCS | Mod: S$GLB,,, | Performed by: INTERNAL MEDICINE

## 2023-11-29 PROCEDURE — 3008F BODY MASS INDEX DOCD: CPT | Mod: CPTII,S$GLB,, | Performed by: INTERNAL MEDICINE

## 2023-11-29 PROCEDURE — 99999 PR PBB SHADOW E&M-EST. PATIENT-LVL IV: CPT | Mod: PBBFAC,,, | Performed by: INTERNAL MEDICINE

## 2023-11-29 PROCEDURE — 4010F PR ACE/ARB THEARPY RXD/TAKEN: ICD-10-PCS | Mod: CPTII,S$GLB,, | Performed by: INTERNAL MEDICINE

## 2023-11-29 PROCEDURE — 36415 COLL VENOUS BLD VENIPUNCTURE: CPT | Performed by: INTERNAL MEDICINE

## 2023-11-29 RX ORDER — HYOSCYAMINE SULFATE 0.12 MG/1
TABLET SUBLINGUAL
Status: ON HOLD | COMMUNITY
Start: 2023-10-13 | End: 2023-12-05 | Stop reason: CLARIF

## 2023-11-29 NOTE — PROGRESS NOTES
Subjective:       Patient ID: Yasmin Andrade is a 63 y.o. female.    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 Bria Wilson with immunology.  She has always had a slight increase in her eosinophil count, however labs on 10/26/2023 when she was fighting an episode of eosinophilic pneumonia revealed an absolute eosinophil count of 15,400. A week or 2 later, this came down to 7900.  Due to the continued elevation of her eosinophil count she was referred to Hematology.  I saw the patient on 2023.  At that visit, she stated that she had nausea, but otherwise had no major issue.     Interval History:   Initial consult note.      Past Medical History:   Diagnosis Date    Asthma     Hypothyroidism     Migraine       Past Surgical History:   Procedure Laterality Date     SECTION      right shoulder sx      sinus sx       Social History     Socioeconomic History    Marital status:    Tobacco Use    Smoking status: Never    Smokeless tobacco: Never   Substance and Sexual Activity    Alcohol use: Never    Drug use: Never      Family History   Problem Relation Age of Onset    No Known Problems Mother     No Known Problems Father     No Known Problems Sister     No Known Problems Brother     Thyroid cancer Daughter       Review of patient's allergies indicates:   Allergen Reactions    Morphine      Other reaction(s): Angioedema    Penicillins Hives    Levofloxacin     Penicillanic sulfone bl beta-lactamase inhibitors Edema      Review of Systems   Constitutional:  Positive for appetite change. Negative for unexpected weight change.   HENT:  Negative for mouth sores.    Eyes:  Negative for visual disturbance.   Respiratory:  Positive for  shortness of breath. Negative for cough.    Cardiovascular:  Positive for chest pain.   Gastrointestinal:  Negative for abdominal pain and diarrhea.   Genitourinary:  Positive for frequency.   Musculoskeletal:  Positive for back pain.   Integumentary:  Negative for rash.   Neurological:  Negative for headaches.   Hematological:  Negative for adenopathy.   Psychiatric/Behavioral:  The patient is not nervous/anxious.          Objective:      Physical Exam  Vitals reviewed. Exam conducted with a chaperone present.   Constitutional:       General: She is not in acute distress.     Appearance: Normal appearance.   HENT:      Head: Normocephalic and atraumatic.      Nose: Nose normal.      Mouth/Throat:      Mouth: Mucous membranes are moist.   Eyes:      Extraocular Movements: Extraocular movements intact.      Conjunctiva/sclera: Conjunctivae normal.   Cardiovascular:      Rate and Rhythm: Normal rate and regular rhythm.      Pulses: Normal pulses.      Heart sounds: Normal heart sounds.   Pulmonary:      Effort: Pulmonary effort is normal.      Breath sounds: Normal breath sounds.   Abdominal:      General: Bowel sounds are normal.      Palpations: Abdomen is soft.   Musculoskeletal:         General: No swelling. Normal range of motion.      Cervical back: Normal range of motion and neck supple.      Right lower leg: No edema.      Left lower leg: No edema.   Lymphadenopathy:      Cervical: No cervical adenopathy.   Skin:     General: Skin is warm and dry.   Neurological:      General: No focal deficit present.      Mental Status: She is alert and oriented to person, place, and time. Mental status is at baseline.   Psychiatric:         Mood and Affect: Mood normal.         Behavior: Behavior normal.         LABS AND IMAGING REVIEWED IN EPIC          Assessment:   Eosinophilia with leukocytosis  Hypogammaglobulinemia        Plan:       CBC today revealed a hemoglobin of 11.7, hematocrit of 38.1 and a platelet count of  288,000.  Her eosinophil count was 780 and her white blood cell count was 6.37.    I will also check a CMP, tryptase, immunoglobulin G subclass 4, B-12, PASQUALE, rheumatoid factor, and stool exam ova and parasites.    We did discuss the role for a bone marrow biopsy today. Patient voiced understanding and would like to proceed with bone marrow biopsy.  We will try to get this next week.    She is scheduled for a MRI MRCP on 12/07/2023    Return to clinic in approximately 5-6 weeks discuss bone marrow biopsy results.    Of note, the patient does see Dr. Alexandra, underwent EGD and colonoscopy in 10/2023, this did not reveal any abnormalities.      Kevin Gillespie II, MD

## 2023-11-30 ENCOUNTER — PATIENT MESSAGE (OUTPATIENT)
Dept: HEMATOLOGY/ONCOLOGY | Facility: CLINIC | Age: 63
End: 2023-11-30
Payer: COMMERCIAL

## 2023-11-30 LAB
ANA PAT SER IF-IMP: ABNORMAL
ANA SER QL HEP2 SUBST: ABNORMAL
ANA TITR SER HEP2 SUBST: ABNORMAL {TITER}
IGG4 SER-MCNC: 52.7 MG/DL (ref 2.4–121)
TRYPTASE SERPL-MCNC: 3.3 NG/ML

## 2023-12-01 ENCOUNTER — DOCUMENTATION ONLY (OUTPATIENT)
Dept: HEMATOLOGY/ONCOLOGY | Facility: CLINIC | Age: 63
End: 2023-12-01
Payer: COMMERCIAL

## 2023-12-01 DIAGNOSIS — R76.8 POSITIVE ANA (ANTINUCLEAR ANTIBODY): Primary | ICD-10-CM

## 2023-12-01 DIAGNOSIS — D72.10 EOSINOPHILIA, UNSPECIFIED TYPE: Primary | ICD-10-CM

## 2023-12-01 DIAGNOSIS — R79.89 ABNORMAL CBC: ICD-10-CM

## 2023-12-01 LAB
RHEUMATOID FACTOR IGA QUANTITATIVE (OLG): 6.5 IU/ML
RHEUMATOID FACTOR IGM QUANTITATIVE (OLG): 6.5 IU/ML

## 2023-12-01 NOTE — PROGRESS NOTES
Patient was seen by me on 11/29/2023 for eosinophilia.  Her PASQUALE returned 1:320 speckled pattern suggesting positivity.  We will need to get confirmatory testing for lupus.    Also, the patient's daughter has an DYLAN gene mutation and therefore the patient had testing done on 08/29/2023 at [facility].  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  We will refer to genetic counseling.    We will proceed with the rest of her plan as previously documented.

## 2023-12-04 ENCOUNTER — LAB VISIT (OUTPATIENT)
Dept: LAB | Facility: HOSPITAL | Age: 63
End: 2023-12-04
Attending: INTERNAL MEDICINE
Payer: COMMERCIAL

## 2023-12-04 ENCOUNTER — PATIENT MESSAGE (OUTPATIENT)
Dept: ADMINISTRATIVE | Facility: OTHER | Age: 63
End: 2023-12-04
Payer: COMMERCIAL

## 2023-12-04 DIAGNOSIS — D72.10 EOSINOPHILIA, UNSPECIFIED TYPE: ICD-10-CM

## 2023-12-04 DIAGNOSIS — R79.89 ABNORMAL CBC: ICD-10-CM

## 2023-12-04 PROCEDURE — 86225 DNA ANTIBODY NATIVE: CPT

## 2023-12-04 PROCEDURE — 36415 COLL VENOUS BLD VENIPUNCTURE: CPT

## 2023-12-04 NOTE — PRE-PROCEDURE INSTRUCTIONS
"[facility]: Outpatient Surgery  Preprocedure Instructions    Expectations: "Because of inconsistent procedure completion times, an unexpected wait may occur. The physicians would like you to be here to prepare in the event they run ahead of time. We will make you as comfortable as possible and keep you informed. We apologize in advance if this happens."     Your arrival time for your surgery or procedure is _8:30_____.  We ask patients to arrive about 2 hours before surgery to allow for enough time to review your health history & medications, start your IV, complete any outstanding labwork or tests, and meet your Anesthesiologist.    We are located at [facility], [address] [place] [address].    Enter through the [facility] entrance next to the Emergency Room, and come to the 6th floor to the Outpatient Surgery Department.    If you are in need of a wheelchair the morning of surgery please call [phone] about 15 minutes before you arrive. Parking is available in our parking garage located off [facility], between the hospital and [facility].      Visitory Policy:  You are allowed 2 adult visitors to be with you in the hospital. Please, no switching visitors in pre-op area. All hospital visitors should be in good current health.  No small children.  We will update you and your family hourly on the progression of surgery and any unexpected delays.      What to Bring:  Please have your ID, insurance cards, and all home medication bottles with you at check in.  Bring your CPAP machine if one is used at home.     Fasting:  Nothing to eat or drink after midnight the night before your procedure. This includes no ice, gum, hard candies, and/or tobacco products.    Medications:  Follow your doctor's instructions for taking any medications on the morning of your procedure.  If no instructions for taking medications were given, do not take any medications but bring your " medications in their bottles to your procedure check in.     Follow your doctor's preoperative instructions regarding skin prep, bowel prep, bathing, or showering prior to your procedure.  If any special soaps were provided to you, please use according to your doctor's instructions. If no instructions were given from your doctor, take a good bath or shower with antibacterial soap the night before and the morning of your procedure.  On the morning of procedure, wear loose, comfortable clothing.  No lotions, makeup, perfumes, colognes, deodorant, or jewelry to your procedure.  Removable items (glasses, contact lenses, dentures, retainers, hearing aids) need to be removed for your procedure.  Bring your storage containers for these items if you wear them.     Artificial nails, body jewelry, eyelash extensions, and/or hair extensions with metal clips are not allowed during your surgery.  If you currently wear any of these items, please arrange for them to be removed prior to your arrival to the hospital.     Outpatient or Same Day Surgeries:  Any patients receiving sedation/anesthesia are advised not to drive for 24 hours after their procedure.  We do not allow patients to drive themselves home after discharge.  If you are going home after your procedure, please have someone available to drive you home from the hospital.     You may call the Outpatient Surgery Department at [phone] with any questions or concerns.  We are looking forward to meeting you and taking great care of you for your procedure.  Thank you for choosing [facility] for your surgical needs.

## 2023-12-05 ENCOUNTER — HOSPITAL ENCOUNTER (OUTPATIENT)
Facility: HOSPITAL | Age: 63
Discharge: HOME | End: 2023-12-05
Attending: PATHOLOGY | Admitting: PATHOLOGY
Payer: COMMERCIAL

## 2023-12-05 ENCOUNTER — PATIENT MESSAGE (OUTPATIENT)
Dept: ADMINISTRATIVE | Facility: OTHER | Age: 63
End: 2023-12-05
Payer: COMMERCIAL

## 2023-12-05 DIAGNOSIS — D72.10 EOSINOPHILIA, UNSPECIFIED TYPE: ICD-10-CM

## 2023-12-05 DIAGNOSIS — R79.89 ABNORMAL CBC: ICD-10-CM

## 2023-12-05 LAB
BASOPHILS # BLD AUTO: 0.04 X10(3)/MCL
BASOPHILS NFR BLD AUTO: 0.7 %
DSDNA AB QUANT (OHS): <0.6 IU/ML
EOSINOPHIL # BLD AUTO: 0.99 X10(3)/MCL (ref 0–0.9)
EOSINOPHIL NFR BLD AUTO: 17.7 %
ERYTHROCYTE [DISTWIDTH] IN BLOOD BY AUTOMATED COUNT: 13.6 % (ref 11.5–17)
HCT VFR BLD AUTO: 34.8 % (ref 37–47)
HGB BLD-MCNC: 11.2 G/DL (ref 12–16)
IMM GRANULOCYTES # BLD AUTO: 0.02 X10(3)/MCL (ref 0–0.04)
IMM GRANULOCYTES NFR BLD AUTO: 0.4 %
LYMPHOCYTES # BLD AUTO: 1.83 X10(3)/MCL (ref 0.6–4.6)
LYMPHOCYTES NFR BLD AUTO: 32.7 %
MCH RBC QN AUTO: 30.5 PG (ref 27–31)
MCHC RBC AUTO-ENTMCNC: 32.2 G/DL (ref 33–36)
MCV RBC AUTO: 94.8 FL (ref 80–94)
MONOCYTES # BLD AUTO: 0.47 X10(3)/MCL (ref 0.1–1.3)
MONOCYTES NFR BLD AUTO: 8.4 %
NEUTROPHILS # BLD AUTO: 2.24 X10(3)/MCL (ref 2.1–9.2)
NEUTROPHILS NFR BLD AUTO: 40.1 %
NRBC BLD AUTO-RTO: 0 %
PLATELET # BLD AUTO: 296 X10(3)/MCL (ref 130–400)
PMV BLD AUTO: 9.1 FL (ref 7.4–10.4)
RBC # BLD AUTO: 3.67 X10(6)/MCL (ref 4.2–5.4)
WBC # SPEC AUTO: 5.59 X10(3)/MCL (ref 4.5–11.5)

## 2023-12-05 PROCEDURE — 81340 TRB@ GENE REARRANGE AMPLIFY: CPT | Mod: 59

## 2023-12-05 PROCEDURE — 88264 CHROMOSOME ANALYSIS 20-25: CPT

## 2023-12-05 PROCEDURE — 85025 COMPLETE CBC W/AUTO DIFF WBC: CPT | Performed by: INTERNAL MEDICINE

## 2023-12-05 PROCEDURE — 88185 FLOWCYTOMETRY/TC ADD-ON: CPT

## 2023-12-05 PROCEDURE — 88275 CYTOGENETICS 100-300: CPT | Mod: 59

## 2023-12-05 PROCEDURE — 38222 DX BONE MARROW BX & ASPIR: CPT | Performed by: PATHOLOGY

## 2023-12-05 PROCEDURE — 99152 MOD SED SAME PHYS/QHP 5/>YRS: CPT | Performed by: PATHOLOGY

## 2023-12-05 PROCEDURE — 63600175 PHARM REV CODE 636 W HCPCS: Performed by: PATHOLOGY

## 2023-12-05 PROCEDURE — 81342 TRG GENE REARRANGEMENT ANAL: CPT

## 2023-12-05 PROCEDURE — 88184 FLOWCYTOMETRY/ TC 1 MARKER: CPT

## 2023-12-05 PROCEDURE — 88271 CYTOGENETICS DNA PROBE: CPT

## 2023-12-05 PROCEDURE — 25000003 PHARM REV CODE 250: Performed by: PATHOLOGY

## 2023-12-05 PROCEDURE — 88237 TISSUE CULTURE BONE MARROW: CPT

## 2023-12-05 PROCEDURE — 81450 HL NEO GSAP 5-50DNA/DNA&RNA: CPT

## 2023-12-05 RX ORDER — MIDAZOLAM HYDROCHLORIDE 1 MG/ML
1 INJECTION INTRAMUSCULAR; INTRAVENOUS
Status: DISCONTINUED | OUTPATIENT
Start: 2023-12-05 | End: 2023-12-05 | Stop reason: HOSPADM

## 2023-12-05 RX ORDER — PANTOPRAZOLE SODIUM 40 MG/1
TABLET, DELAYED RELEASE ORAL
COMMUNITY

## 2023-12-05 RX ORDER — SODIUM CHLORIDE 9 MG/ML
INJECTION, SOLUTION INTRAVENOUS CONTINUOUS
Status: DISCONTINUED | OUTPATIENT
Start: 2023-12-05 | End: 2023-12-05 | Stop reason: HOSPADM

## 2023-12-05 RX ORDER — FLUMAZENIL 0.1 MG/ML
0.2 INJECTION INTRAVENOUS
Status: DISCONTINUED | OUTPATIENT
Start: 2023-12-05 | End: 2023-12-05 | Stop reason: HOSPADM

## 2023-12-05 RX ORDER — ONDANSETRON 4 MG/1
4 TABLET, ORALLY DISINTEGRATING ORAL 3 TIMES DAILY
COMMUNITY
Start: 2023-11-08

## 2023-12-05 RX ADMIN — MIDAZOLAM 2 MG: 1 INJECTION INTRAMUSCULAR; INTRAVENOUS at 11:12

## 2023-12-05 RX ADMIN — MIDAZOLAM 1 MG: 1 INJECTION INTRAMUSCULAR; INTRAVENOUS at 11:12

## 2023-12-05 RX ADMIN — SODIUM CHLORIDE: 9 INJECTION, SOLUTION INTRAVENOUS at 11:12

## 2023-12-05 NOTE — PROCEDURES
"Yasmin Andrade is a 63 y.o. female patient.    Temp: 97.7 °F (36.5 °C) (12/05/23 0947)  Pulse: 82 (12/05/23 1125)  Resp: 19 (12/05/23 1118)  BP: 124/72 (12/05/23 1121)  SpO2: 96 % (12/05/23 1125)  Weight: 60.8 kg (134 lb) (12/05/23 0940)  Height: 5' 8" (172.7 cm) (12/05/23 0940)  Mallampati Scale: Class II  ASA Classification: Class 2    Bone marrow    Date/Time: 12/5/2023 11:27 AM    Performed by: Imer Toney MD  Authorized by: Imer Toney MD    Consent Done?:   Immediately prior to procedure a time out was called to verify the correct patient, procedure, equipment, support staff and site/side marked as required.   Patient was prepped and draped in the usual sterile fashion.      Assistants?: Yes    List of assistants: Wilma Tran I was present for the entire procedure.    Position: left lateral  Anesthesia: local infiltration  Local anesthetic: lidocaine 1% without epinephrine  Aspiration?: Yes   Biopsy?: Yes    Specimen source: right posterior iliac crest  Number of Specimens: 2  Estimated blood loss (cc):2  Patient tolerated the procedure well with no immediate complications.  Post-operative instructions were provided for the patient.  Patient was discharged and will follow up if any complications occur.     Patient sedated with 6mg Versed IV, administered in titrated doses.      12/5/2023    "

## 2023-12-05 NOTE — PLAN OF CARE
Pt/fm provided with written/verbal instructions  Pt/fm verbalized understanding  Questions answered regarding  BLEEDING: If surgical site begins to bleed , contact your doctor or go to ER    NAUSEA: due to the anesthesia, you may experience nausea for up to 24 hours. If nausea and vomiting last longer, contact your doctor.     INFECTION:  watch for any signs or symptoms of infection such as chills, fever, redness or drainage at surgical site. Notify your doctor     PAIN : take your pain medications as directed. If the pain medications are not helping, notify your doctor.

## 2023-12-05 NOTE — PROGRESS NOTES
[facility] - Periop Services  Discharge Note  Short Stay    Procedure(s) (LRB):  Biopsy-bone marrow (N/A)      OUTCOME: Patient tolerated treatment/procedure well without complication and is now ready for discharge.    DISPOSITION: Home or Self Care    FINAL DIAGNOSIS:  <principal problem not specified>    FOLLOWUP: In clinic    DISCHARGE INSTRUCTIONS:  No discharge procedures on file.     TIME SPENT ON DISCHARGE: 20 minutes

## 2023-12-05 NOTE — DISCHARGE INSTRUCTIONS
-No driving for 24 hours and while taking narcotic pain medication.    -May remove band-aid tomorrow and shower.    - Report to ER with any bleeding, heavy bruising at site, or SEVERE/SUDDEN unrelieved abdominal pain.    - Biopsy results will be sent to your oncologist/referring physician.    - Call with any questions or concerns.    -May take over the counter meds or use ice packs for pain/discomfort.    BLEEDING: If surgical site begins to bleed , contact your doctor or go to ER    NAUSEA: due to the anesthesia, you may experience nausea for up to 24 hours. If nausea and vomiting last longer, contact your doctor.     INFECTION:  watch for any signs or symptoms of infection such as chills, fever, redness or drainage at surgical site. Notify your doctor     PAIN : take your pain medications as directed. If the pain medications are not helping, notify your doctor.

## 2023-12-06 ENCOUNTER — PATIENT MESSAGE (OUTPATIENT)
Dept: ADMINISTRATIVE | Facility: OTHER | Age: 63
End: 2023-12-06
Payer: COMMERCIAL

## 2023-12-07 ENCOUNTER — PATIENT MESSAGE (OUTPATIENT)
Dept: ADMINISTRATIVE | Facility: OTHER | Age: 63
End: 2023-12-07
Payer: COMMERCIAL

## 2023-12-07 ENCOUNTER — HOSPITAL ENCOUNTER (OUTPATIENT)
Dept: RADIOLOGY | Facility: HOSPITAL | Age: 63
Discharge: HOME | End: 2023-12-07
Attending: NURSE PRACTITIONER
Payer: COMMERCIAL

## 2023-12-07 VITALS
OXYGEN SATURATION: 99 % | HEIGHT: 68 IN | WEIGHT: 134 LBS | DIASTOLIC BLOOD PRESSURE: 82 MMHG | SYSTOLIC BLOOD PRESSURE: 153 MMHG | TEMPERATURE: 98 F | HEART RATE: 76 BPM | BODY MASS INDEX: 20.31 KG/M2 | RESPIRATION RATE: 13 BRPM

## 2023-12-07 DIAGNOSIS — K21.00 GASTRO-ESOPHAGEAL REFLUX DISEASE WITH ESOPHAGITIS: ICD-10-CM

## 2023-12-07 DIAGNOSIS — K86.2 CYST OF PANCREAS: ICD-10-CM

## 2023-12-07 DIAGNOSIS — Z79.2 ENCOUNTER FOR LONG-TERM (CURRENT) USE OF ANTIBIOTICS: ICD-10-CM

## 2023-12-07 DIAGNOSIS — R11.0 NAUSEA: ICD-10-CM

## 2023-12-07 DIAGNOSIS — R13.14 DYSPHAGIA, PHARYNGOESOPHAGEAL PHASE: ICD-10-CM

## 2023-12-07 DIAGNOSIS — R07.9 CHEST PAIN, UNSPECIFIED: ICD-10-CM

## 2023-12-07 PROCEDURE — 74181 MRI ABDOMEN W/O CONTRAST: CPT | Mod: TC

## 2023-12-13 ENCOUNTER — TELEPHONE (OUTPATIENT)
Dept: HEMATOLOGY/ONCOLOGY | Facility: CLINIC | Age: 63
End: 2023-12-13
Payer: COMMERCIAL

## 2023-12-13 NOTE — TELEPHONE ENCOUNTER
Patient recently had bm bx and sees you on 01/03/24 for results. Her pulmonologist has offered to start Fasenra injections to help decrease her eosinophil count. She would like to know if this would be okay to start or should she wait until she goes over bm bx results with you? States she has had several bouts of pneumonia. Please advise.

## 2023-12-19 ENCOUNTER — OFFICE VISIT (OUTPATIENT)
Dept: HEMATOLOGY/ONCOLOGY | Facility: CLINIC | Age: 63
End: 2023-12-19
Payer: COMMERCIAL

## 2023-12-19 DIAGNOSIS — D72.10 EOSINOPHILIA, UNSPECIFIED TYPE: ICD-10-CM

## 2023-12-19 DIAGNOSIS — Z15.01 MONOALLELIC MUTATION OF ATM GENE: Primary | ICD-10-CM

## 2023-12-19 DIAGNOSIS — Z80.3 FAMILY HISTORY OF BREAST CANCER: ICD-10-CM

## 2023-12-19 DIAGNOSIS — Z15.09 MONOALLELIC MUTATION OF ATM GENE: Primary | ICD-10-CM

## 2023-12-19 DIAGNOSIS — Z15.89 MONOALLELIC MUTATION OF ATM GENE: Primary | ICD-10-CM

## 2023-12-19 DIAGNOSIS — R79.89 ABNORMAL CBC: ICD-10-CM

## 2023-12-19 PROCEDURE — 1159F MED LIST DOCD IN RCRD: CPT | Mod: CPTII,95,, | Performed by: NURSE PRACTITIONER

## 2023-12-19 PROCEDURE — 3044F PR MOST RECENT HEMOGLOBIN A1C LEVEL <7.0%: ICD-10-PCS | Mod: CPTII,95,, | Performed by: NURSE PRACTITIONER

## 2023-12-19 PROCEDURE — 4010F PR ACE/ARB THEARPY RXD/TAKEN: ICD-10-PCS | Mod: CPTII,95,, | Performed by: NURSE PRACTITIONER

## 2023-12-19 PROCEDURE — 3044F HG A1C LEVEL LT 7.0%: CPT | Mod: CPTII,95,, | Performed by: NURSE PRACTITIONER

## 2023-12-19 PROCEDURE — 1159F PR MEDICATION LIST DOCUMENTED IN MEDICAL RECORD: ICD-10-PCS | Mod: CPTII,95,, | Performed by: NURSE PRACTITIONER

## 2023-12-19 PROCEDURE — 99215 PR OFFICE/OUTPT VISIT, EST, LEVL V, 40-54 MIN: ICD-10-PCS | Mod: 95,,, | Performed by: NURSE PRACTITIONER

## 2023-12-19 PROCEDURE — 99215 OFFICE O/P EST HI 40 MIN: CPT | Mod: 95,,, | Performed by: NURSE PRACTITIONER

## 2023-12-19 PROCEDURE — 4010F ACE/ARB THERAPY RXD/TAKEN: CPT | Mod: CPTII,95,, | Performed by: NURSE PRACTITIONER

## 2023-12-19 NOTE — PROGRESS NOTES
REFERRING PHYSICIAN: Dr. Kevin Gillespie    Subjective:       Patient ID: Yasmin Andrade is a 63 y.o. female.    Chief Complaint: No personal history of cancer but a family history of cancer and a known DYLAN mutation in her daughter. Patient had genetic testing through [facility] with findings of the same DYLAN mutation. She presented via Telemedicine Visit (audio and video) today for discussion of implications of this result.     HPI  Past Medical History:   Diagnosis Date    Asthma     Hypothyroidism     Migraine       Past Surgical History:   Procedure Laterality Date    BONE MARROW BIOPSY N/A 2023    Procedure: Biopsy-bone marrow;  Surgeon: Imer Toney MD;  [facility];  Service: General;  Laterality: N/A;     SECTION      right shoulder sx      sinus sx        Review of patient's allergies indicates:   Allergen Reactions    Levofloxacin Anaphylaxis    Morphine Itching and Swelling     Other reaction(s): Angioedema    Penicillanic sulfone bl beta-lactamase inhibitors Anaphylaxis    Penicillins Anaphylaxis      Review of Systems   Constitutional:  Positive for appetite change and unexpected weight change.   HENT:  Negative for mouth sores.    Eyes:  Negative for visual disturbance.   Respiratory:  Negative for cough and shortness of breath.    Cardiovascular:  Negative for chest pain.   Gastrointestinal:  Negative for abdominal pain and diarrhea.   Genitourinary:  Negative for frequency.   Musculoskeletal:  Negative for back pain.   Integumentary:  Negative for rash.   Neurological:  Negative for headaches.   Hematological:  Negative for adenopathy.   Psychiatric/Behavioral:  The patient is not nervous/anxious.              Problem List Items Addressed This Visit    None  Visit Diagnoses       Eosinophilia, unspecified type        Abnormal CBC              Oncology History    No history exists.          Family History   Problem Relation Age of Onset    Heart attack Mother      Dementia Sister     Stroke Maternal Grandfather     Heart attack Paternal Grandmother 50    Stomach cancer Paternal Grandfather     Thyroid cancer Daughter 31    Ovarian cancer Daughter 21    Genetic Disease Carrier Daughter         DYLAN+    Genetic Disease Carrier Daughter         DYLAN+    Breast cancer Maternal Cousin 53    Lung cancer Paternal Aunt     Brain cancer Paternal Aunt     Breast cancer Paternal Cousin 60    Leukemia Paternal Cousin 7    Cancer Paternal Cousin     Cancer Paternal Cousin         Assessment:     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  This frameshift variant causes the premature termination of DYLAN protein synthesis. The DYLAN gene (NM_000051.3), which is classically associated with the autosomal recessive (biallelic) condition ataxia-telangiectasia (AT), is involved in the cellular response to DNA damage and cell-cycle control. Monoallelic pathogenic mutations in this gene have been estimated to confer up to 52% lifetime risk for female breast cancer. Monoallelic mutations in this gene have also been reported in patients with prostate cancer and hereditary pancreatic cancer (5% compared to 1.1% lifetime risk of general population).    The National Comprehensive Cancer Network (NCCN) considers an DYLAN mutation as a high risk for breast cancer and recommends annual breast MRI and annual mammography starting at 40y, or 5-10 years before the earliest known breast cancer in the family. A reasonable schedule is mammogram alternating with breast MRI every six months. NCCN states that risk-reducing prophylactic mastectomy may be considered based on family history. Patient will be referred to the [facility] for management of her breast cancer risk.    For patients with a family history of pancreatic cancer, consider available options for pancreatic cancer screening, including the possibility of endoscopic ultrasonography (EUS)  and MRI/magnetic resonance cholangiopancreatography (MRCP). Screening should begin at 50y, or 10 years prior to the earliest pancreatic cancer diagnosis in the family. It is recommended that patients who are candidates for pancreatic cancer screening be managed by a multidisciplinary team with experience in screening for pancreatic cancer, preferably within research protocols. Patient reported that she is currently under evaluation for a pancreatic cyst with Dr. Alexandra. A copy of this note will be forwarded to him.    Each first degree relative of this individual has a 50% risk of having this same mutation. Parents who both carry an DYLAN mutation have a 25% risk of having a child with AT. Family members can be tested using specific site analysis. Patient will be provided with a simple letter of explanation to assist with informing family members.      A copy of a letter to assist with informing family members will be emailed to: [email or web address]         The patient location is: home    Visit type: audiovisual    Face to Face time with patient: 30 minutes  >40 minutes of total time spent on the encounter, which includes face to face time and non-face to face time preparing to see the patient (eg, review of tests), Obtaining and/or reviewing separately obtained history, Documenting clinical information in the electronic or other health record, Independently interpreting results (not separately reported) and communicating results to the patient/family/caregiver, or Care coordination (not separately reported).         Each patient to whom he or she provides medical services by telemedicine is:  (1) informed of the relationship between the physician and patient and the respective role of any other health care provider with respect to management of the patient; and (2) notified that he or she may decline to receive medical services by telemedicine and may withdraw from such care at any time.    ARNAUD GUTIERREZ,  PhD

## 2023-12-26 LAB — PSYCHE PATHOLOGY RESULT: NORMAL

## 2024-01-03 NOTE — PROGRESS NOTES
Subjective:       Patient ID: Yasmin Andrade is a 63 y.o. female.    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 Bria Wilson with immunology.  She has always had a slight increase in her eosinophil count, however labs on 10/26/2023 when she was fighting an episode of eosinophilic pneumonia revealed an absolute eosinophil count of 15,400. A week or 2 later, this came down to 7900.  Due to the continued elevation of her eosinophil count she was referred to Hematology.  I saw the patient on 11/29/2023.  At that visit, she stated that she had nausea, but otherwise had no major issue.  Workup at my initial visit with her on 11/29/2023 showed an elevated rheumatoid factor IgM at 6.5 (upper limit of normal is 3.5) along with a positive PASQUALE titer at 1:320.  Follow up testing on 12/04/2023 with double-stranded DNA antibody was normal.  Bone marrow biopsy done on 12/05/2023 showed a normocellular marrow (40%) with sequential trilineage hematopoiesis, benign lymphoid aggregates and no increased eosinophils, dysplasia, or lymphoma.  FISH, next generation sequencing, flow cytometry all demonstrated normal results.  There was a T-cell gene rearrangement present that was clonal, however this does not necessarily indicate the presence of a T-cell neoplasm, and they are particularly likely to occur in settings where there is physiologic restriction of the T-cell repertoire such as autoimmune disorders or with normal aging.     Interval History:   Patient presents to clinic for scheduled follow up appointment.  Overall, she still has fatigue.  She has abdominal discomfort.  She has nausea.  She is having issues with eating.  She just generally does not  feel well.      Past Medical History:   Diagnosis Date    Asthma     Hypothyroidism     Migraine       Past Surgical History:   Procedure Laterality Date    BONE MARROW BIOPSY N/A 2023    Procedure: Biopsy-bone marrow;  Surgeon: Imer Toney MD;  Location: [facility];  Service: General;  Laterality: N/A;     SECTION      right shoulder sx      sinus sx       Social History     Socioeconomic History    Marital status:    Tobacco Use    Smoking status: Never    Smokeless tobacco: Never   Substance and Sexual Activity    Alcohol use: Never    Drug use: Never      Family History   Problem Relation Age of Onset    Heart attack Mother     Dementia Sister     Stroke Maternal Grandfather     Heart attack Paternal Grandmother 50    Stomach cancer Paternal Grandfather     Thyroid cancer Daughter 31    Ovarian cancer Daughter 21    Genetic Disease Carrier Daughter         DYLAN+    Genetic Disease Carrier Daughter         DYLAN+    Breast cancer Maternal Cousin 53    Lung cancer Paternal Aunt     Brain cancer Paternal Aunt     Breast cancer Paternal Cousin 60    Leukemia Paternal Cousin 7    Cancer Paternal Cousin     Cancer Paternal Cousin       Review of patient's allergies indicates:   Allergen Reactions    Levofloxacin Anaphylaxis    Morphine Itching and Swelling     Other reaction(s): Angioedema    Penicillanic sulfone bl beta-lactamase inhibitors Anaphylaxis    Penicillins Anaphylaxis      Review of Systems   Constitutional:  Negative for appetite change and unexpected weight change.   HENT:  Negative for mouth sores.    Eyes:  Negative for visual disturbance.   Respiratory:  Negative for cough and shortness of breath.    Cardiovascular:  Negative for chest pain.   Gastrointestinal:  Positive for nausea. Negative for abdominal pain and diarrhea.   Genitourinary:  Negative for frequency.   Musculoskeletal:  Negative for back pain.   Integumentary:  Negative for rash.   Neurological:  Negative for headaches.    Hematological:  Negative for adenopathy.   Psychiatric/Behavioral:  The patient is not nervous/anxious.          Objective:      Physical Exam  Vitals reviewed. Exam conducted with a chaperone present.   Constitutional:       General: She is not in acute distress.     Appearance: Normal appearance.   HENT:      Head: Normocephalic and atraumatic.      Nose: Nose normal.      Mouth/Throat:      Mouth: Mucous membranes are moist.   Eyes:      Extraocular Movements: Extraocular movements intact.      Conjunctiva/sclera: Conjunctivae normal.   Cardiovascular:      Rate and Rhythm: Normal rate and regular rhythm.      Pulses: Normal pulses.      Heart sounds: Normal heart sounds.   Pulmonary:      Effort: Pulmonary effort is normal.      Breath sounds: Normal breath sounds.   Abdominal:      General: Bowel sounds are normal.      Palpations: Abdomen is soft.   Musculoskeletal:         General: No swelling. Normal range of motion.      Cervical back: Normal range of motion and neck supple.      Right lower leg: No edema.      Left lower leg: No edema.   Lymphadenopathy:      Cervical: No cervical adenopathy.   Skin:     General: Skin is warm and dry.   Neurological:      General: No focal deficit present.      Mental Status: She is alert and oriented to person, place, and time. Mental status is at baseline.   Psychiatric:         Mood and Affect: Mood normal.         Behavior: Behavior normal.         LABS AND IMAGING REVIEWED IN EPIC          Assessment:   Eosinophilia with leukocytosis  Hypogammaglobulinemia        Plan:       The patient's bone marrow biopsy returned without evidence of reason for her eosinophilia from a bone marrow perspective. Of note, the patient does see Dr. Alexandra, underwent EGD and colonoscopy in 10/2023, this did not reveal any abnormalities.    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 Alexandra and will also be referred to high-risk breast cancer  clinic.    Checking quantitative immunoglobulins today to see if the patient would benefit from IVIG.    We will also workup for acute intermittent porphyria due to her vague abdominal issues.    Return to clinic in 3 months with CBC, CMP, QIMGs.    Kevin Gillespie II, MD

## 2024-01-04 ENCOUNTER — OFFICE VISIT (OUTPATIENT)
Dept: HEMATOLOGY/ONCOLOGY | Facility: CLINIC | Age: 64
End: 2024-01-04
Payer: COMMERCIAL

## 2024-01-04 VITALS
OXYGEN SATURATION: 100 % | WEIGHT: 139.75 LBS | SYSTOLIC BLOOD PRESSURE: 118 MMHG | BODY MASS INDEX: 21.18 KG/M2 | HEART RATE: 75 BPM | HEIGHT: 68 IN | RESPIRATION RATE: 18 BRPM | DIASTOLIC BLOOD PRESSURE: 80 MMHG

## 2024-01-04 DIAGNOSIS — Z15.89 MONOALLELIC MUTATION OF ATM GENE: ICD-10-CM

## 2024-01-04 DIAGNOSIS — Z15.01 MONOALLELIC MUTATION OF ATM GENE: ICD-10-CM

## 2024-01-04 DIAGNOSIS — R79.89 ABNORMAL CBC: ICD-10-CM

## 2024-01-04 DIAGNOSIS — Z15.09 MONOALLELIC MUTATION OF ATM GENE: ICD-10-CM

## 2024-01-04 DIAGNOSIS — D72.10 EOSINOPHILIA, UNSPECIFIED TYPE: Primary | ICD-10-CM

## 2024-01-04 LAB
ALBUMIN SERPL-MCNC: 4.2 G/DL (ref 3.4–4.8)
ALBUMIN/GLOB SERPL: 1.8 RATIO (ref 1.1–2)
ALP SERPL-CCNC: 62 UNIT/L (ref 40–150)
ALT SERPL-CCNC: 23 UNIT/L (ref 0–55)
AST SERPL-CCNC: 19 UNIT/L (ref 5–34)
BASOPHILS # BLD AUTO: 0.02 X10(3)/MCL
BASOPHILS NFR BLD AUTO: 0.4 %
BILIRUB SERPL-MCNC: 0.6 MG/DL
BUN SERPL-MCNC: 12.3 MG/DL (ref 9.8–20.1)
CALCIUM SERPL-MCNC: 9.1 MG/DL (ref 8.4–10.2)
CHLORIDE SERPL-SCNC: 105 MMOL/L (ref 98–107)
CO2 SERPL-SCNC: 28 MMOL/L (ref 23–31)
CREAT SERPL-MCNC: 0.75 MG/DL (ref 0.55–1.02)
EOSINOPHIL # BLD AUTO: 0 X10(3)/MCL (ref 0–0.9)
EOSINOPHIL NFR BLD AUTO: 0 %
ERYTHROCYTE [DISTWIDTH] IN BLOOD BY AUTOMATED COUNT: 13.1 % (ref 11.5–17)
GFR SERPLBLD CREATININE-BSD FMLA CKD-EPI: >60 MLS/MIN/1.73/M2
GLOBULIN SER-MCNC: 2.3 GM/DL (ref 2.4–3.5)
GLUCOSE SERPL-MCNC: 72 MG/DL (ref 82–115)
HCT VFR BLD AUTO: 36.8 % (ref 37–47)
HGB BLD-MCNC: 12.1 G/DL (ref 12–16)
IGA SERPL-MCNC: 181 MG/DL (ref 69–517)
IGG SERPL-MCNC: 591 MG/DL (ref 522–1631)
IGM SERPL-MCNC: 129 MG/DL (ref 33–293)
IMM GRANULOCYTES # BLD AUTO: 0 X10(3)/MCL (ref 0–0.04)
IMM GRANULOCYTES NFR BLD AUTO: 0 %
LYMPHOCYTES # BLD AUTO: 2.23 X10(3)/MCL (ref 0.6–4.6)
LYMPHOCYTES NFR BLD AUTO: 40 %
MCH RBC QN AUTO: 30.9 PG (ref 27–31)
MCHC RBC AUTO-ENTMCNC: 32.9 G/DL (ref 33–36)
MCV RBC AUTO: 93.9 FL (ref 80–94)
MONOCYTES # BLD AUTO: 0.45 X10(3)/MCL (ref 0.1–1.3)
MONOCYTES NFR BLD AUTO: 8.1 %
NEUTROPHILS # BLD AUTO: 2.88 X10(3)/MCL (ref 2.1–9.2)
NEUTROPHILS NFR BLD AUTO: 51.5 %
PLATELET # BLD AUTO: 327 X10(3)/MCL (ref 130–400)
PMV BLD AUTO: 8.7 FL (ref 7.4–10.4)
POTASSIUM SERPL-SCNC: 4.1 MMOL/L (ref 3.5–5.1)
PROT SERPL-MCNC: 6.5 GM/DL (ref 5.8–7.6)
RBC # BLD AUTO: 3.92 X10(6)/MCL (ref 4.2–5.4)
SODIUM SERPL-SCNC: 140 MMOL/L (ref 136–145)
WBC # SPEC AUTO: 5.58 X10(3)/MCL (ref 4.5–11.5)

## 2024-01-04 PROCEDURE — 3074F SYST BP LT 130 MM HG: CPT | Mod: CPTII,S$GLB,, | Performed by: INTERNAL MEDICINE

## 2024-01-04 PROCEDURE — 1159F MED LIST DOCD IN RCRD: CPT | Mod: CPTII,S$GLB,, | Performed by: INTERNAL MEDICINE

## 2024-01-04 PROCEDURE — 3008F BODY MASS INDEX DOCD: CPT | Mod: CPTII,S$GLB,, | Performed by: INTERNAL MEDICINE

## 2024-01-04 PROCEDURE — 36415 COLL VENOUS BLD VENIPUNCTURE: CPT | Performed by: INTERNAL MEDICINE

## 2024-01-04 PROCEDURE — 84311 SPECTROPHOTOMETRY: CPT | Performed by: INTERNAL MEDICINE

## 2024-01-04 PROCEDURE — 3079F DIAST BP 80-89 MM HG: CPT | Mod: CPTII,S$GLB,, | Performed by: INTERNAL MEDICINE

## 2024-01-04 PROCEDURE — 99214 OFFICE O/P EST MOD 30 MIN: CPT | Mod: S$GLB,,, | Performed by: INTERNAL MEDICINE

## 2024-01-04 PROCEDURE — 99999 PR PBB SHADOW E&M-EST. PATIENT-LVL IV: CPT | Mod: PBBFAC,,, | Performed by: INTERNAL MEDICINE

## 2024-01-04 PROCEDURE — 80053 COMPREHEN METABOLIC PANEL: CPT | Performed by: INTERNAL MEDICINE

## 2024-01-04 PROCEDURE — 1160F RVW MEDS BY RX/DR IN RCRD: CPT | Mod: CPTII,S$GLB,, | Performed by: INTERNAL MEDICINE

## 2024-01-04 PROCEDURE — 85025 COMPLETE CBC W/AUTO DIFF WBC: CPT | Performed by: INTERNAL MEDICINE

## 2024-01-04 PROCEDURE — 82784 ASSAY IGA/IGD/IGG/IGM EACH: CPT | Performed by: INTERNAL MEDICINE

## 2024-01-04 RX ORDER — ONDANSETRON 4 MG/1
4 TABLET, FILM COATED ORAL EVERY 8 HOURS PRN
COMMUNITY
Start: 2023-10-31

## 2024-01-04 RX ORDER — KETOROLAC TROMETHAMINE 10 MG/1
10 TABLET, FILM COATED ORAL EVERY 8 HOURS PRN
COMMUNITY
Start: 2023-10-31

## 2024-01-04 RX ORDER — LISINOPRIL 10 MG/1
TABLET ORAL
COMMUNITY

## 2024-01-04 RX ORDER — ALPRAZOLAM 0.5 MG
2 TABLET ORAL
COMMUNITY
Start: 2023-12-06

## 2024-01-04 RX ORDER — TAMSULOSIN HYDROCHLORIDE 0.4 MG/1
1 CAPSULE ORAL
COMMUNITY
Start: 2023-10-13

## 2024-01-04 RX ORDER — BENRALIZUMAB 30 MG/ML
INJECTION, SOLUTION SUBCUTANEOUS
COMMUNITY
Start: 2023-12-11 | End: 2024-02-20 | Stop reason: SDUPTHER

## 2024-01-04 RX ORDER — TOLTERODINE 4 MG/1
CAPSULE, EXTENDED RELEASE ORAL
COMMUNITY

## 2024-01-04 RX ORDER — PROMETHAZINE HYDROCHLORIDE 12.5 MG/1
12.5 TABLET ORAL
COMMUNITY

## 2024-01-08 LAB
CLINICAL BIOCHEMIST REVIEW: NORMAL
PORPHYRINS SERPL-MCNC: <1 MCG/DL
PROVIDER SIGNING NAME: NORMAL

## 2024-01-09 RX ORDER — NITROGLYCERIN 0.4 MG/1
TABLET SUBLINGUAL
COMMUNITY
Start: 2023-05-30

## 2024-01-10 NOTE — PROGRESS NOTES
[facility]  Breast Surgical Oncology  New Patient Office Visit - H&P      Referring Provider: Dr. Erum Sparrow  PCP: No, Primary Doctor   Care Team:  OBGYN: No data on file.    Chief Complaint:   Chief Complaint   Patient presents with    Genetic Evaluation     Patient reports lump in left breast LIQ x 2 years, no pain, no swelling        Subjective:     HPI:  Yasmin Andrade is a 63 y.o. female who presents on 1/11/2024 for evaluation of risk for breast cancer. Patient was recently diagnosed with the DYLAN gene which, according to NCCN, puts her at a 20-30% lifetime risk of developing breast cancer.     Patient is doing well today. Patient states she had a small lump under her left, medial breast that has been here for about 2 years now. She states this lump was previously imaged when she lived in [place], and it was found to be benign. She states it has not changed in size and is not causing her any pain or discomfort at this time. She currently denies any other breast issues including rashes, redness, pain, swelling, nipple discharge, or new lumps/masses. Patient's last screening mammogram was done in October of 2022 which resulted as negative. Patient states she does perform self-breast exams.  She states she has been living a healthy lifestyle. She does not smoke and she drinks alcohol in moderation.    Of note, patient has 2 daughters also tested positive for DYLAN gene.  One of her daughters was diagnosed with ovarian cancer at the age of 21 and most recently diagnosed with thyroid cancer at the age of 31.      Imaging:   10/10/2022 [facility] MG - There is no mammographic evidence of malignancy. BI-RADS: 1 Negative     Pathology:   none    OB/GYN History:  Age at Menarche Onset: 14  Menopausal Status: postmenopausal, LMP: No LMP recorded. Patient is postmenopausal.  Hysterectomy/Oophorectomy: menopause, at age 59  Hormonal birth control (duration): none  Pregnancy  History:   Age at first live birth: 26  Hormone Replacement Therapy: No, none    Other:  MG breast density: BIRADS B   Prior thoracic RT: none  Genetic testing: Yes. 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 ([id number]*)   Ashkenazi Alevism descent: No    Family History:  Family History   Problem Relation Age of Onset    Heart attack Mother     Thyroid disease Mother     Heart failure Father     Dementia Sister     Stroke Maternal Grandfather     Heart attack Paternal Grandmother 50    Stomach cancer Paternal Grandfather     Thyroid cancer Daughter 31    Ovarian cancer Daughter 21    Genetic Disease Carrier Daughter         DYLAN+    Genetic Disease Carrier Daughter         DYLAN+    Breast cancer Maternal Cousin 53    Lung cancer Paternal Aunt     Brain cancer Paternal Aunt     Breast cancer Paternal Cousin 60    Leukemia Paternal Cousin 7    Cancer Paternal Cousin     Cancer Paternal Cousin         Patient History:  Past Medical History:   Diagnosis Date    Asthma     Hypothyroidism     Menopause     Migraine     Pregnancy        Past Surgical History:   Procedure Laterality Date    APPENDECTOMY      BONE MARROW BIOPSY N/A 2023    Procedure: Biopsy-bone marrow;  Surgeon: Imer Toney MD;  Location: [facility] OR;  Service: General;  Laterality: N/A;     SECTION      right shoulder sx      sinus sx      TUBAL LIGATION         Social History     Socioeconomic History    Marital status:    Tobacco Use    Smoking status: Never    Smokeless tobacco: Never   Substance and Sexual Activity    Alcohol use: Yes     Alcohol/week: 1.0 standard drink of alcohol     Types: 1 Glasses of wine per week    Drug use: Never    Sexual activity: Yes     Partners: Male     Birth control/protection: None         There is no immunization history on file for this patient.    Medications/Allergies:    Current Outpatient Medications:     BREO ELLIPTA 200-25 mcg/dose DsDv diskus inhaler, INHALE 1  "PUFF BY MOUTH ONCE A DAY, Disp: , Rfl:     FASENRA PEN 30 mg/mL AtIn, Inject into the skin., Disp: , Rfl:     ketorolac (TORADOL) 10 mg tablet, Take 10 mg by mouth every 8 (eight) hours as needed., Disp: , Rfl:     levothyroxine (SYNTHROID) 50 MCG tablet, Take 50 mcg by mouth once daily., Disp: , Rfl:     lisinopriL 10 MG tablet, 1 tablet Orally Once a day, Disp: , Rfl:     montelukast (SINGULAIR) 10 mg tablet, Take 10 mg by mouth once daily., Disp: , Rfl:     nitroGLYCERIN (NITROSTAT) 0.4 MG SL tablet, 25, Disp: , Rfl:     ondansetron (ZOFRAN) 4 MG tablet, Take 4 mg by mouth every 8 (eight) hours as needed., Disp: , Rfl:     ondansetron (ZOFRAN-ODT) 4 MG TbDL, Take 4 mg by mouth 3 (three) times daily., Disp: , Rfl:     pantoprazole (PROTONIX) 40 MG tablet, TAKE 1 TABLET BY MOUTH EVERY DAY 30 MIN BEFORE BREAKFAST, Disp: , Rfl:     promethazine (PHENERGAN) 12.5 MG Tab, Take 12.5 mg by mouth., Disp: , Rfl:     SPIRIVA RESPIMAT 1.25 mcg/actuation inhaler, INHALE TWO PUFFS ONCE A DAY, Disp: , Rfl:     tamsulosin (FLOMAX) 0.4 mg Cap, Take 1 capsule by mouth., Disp: , Rfl:     tolterodine (DETROL LA) 4 MG 24 hr capsule, 90, Disp: , Rfl:     topiramate (TOPAMAX) 50 MG tablet, Take 50 mg by mouth once daily., Disp: , Rfl:     XANAX 0.5 mg tablet, 2 tablets., Disp: , Rfl:      Review of patient's allergies indicates:   Allergen Reactions    Levofloxacin Anaphylaxis    Morphine Itching and Swelling     Other reaction(s): Angioedema    Penicillanic sulfone bl beta-lactamase inhibitors Anaphylaxis    Penicillins Anaphylaxis       Review of Systems:  All pertinent history mentioned in HPI.     Objective:     Vitals:  Vitals:    01/11/24 1007   BP: 126/77   BP Location: Left arm   Patient Position: Sitting   BP Method: Medium (Automatic)   Pulse: 78   Resp: 18   Temp: 98 °F (36.7 °C)   TempSrc: Oral   SpO2: 98%   Weight: 63 kg (139 lb)   Height: 5' 8" (1.727 m)       Body mass index is 21.13 kg/m².     Physical Exam:  General: " The patient is awake, alert and oriented times three. The patient is well nourished and in no acute distress.  Neck: There is no evidence of palpable cervical, supraclavicular or axillary adenopathy. The neck is supple. The thyroid is not enlarged.  Musculoskeletal: The patient has a normal range of motion of her bilateral upper extremities.  Chest: Examination of the chest wall fails to reveal any obvious abnormalities.  The lungs are clear to auscultation bilaterally without rales, rhonchi, or wheezing.  Cardiovascular: The heart has a regular rate and rhythm without murmurs, gallops or rubs.  Breast:   Right:  Examination of right breast fails to reveal any dominant masses or areas of significant focal nodularity. The nipple is everted without evidence of discharge. There is no skin dimpling with movement of the pectoralis. There is no significant skin changes overlying the breast.   Left:  Examination of the left breast reveals a hard, well circumscribed 1cm lump in the medial inframammary fold. The nipple is everted without evidence of discharge. There is no skin dimpling with movement of the pectoralis. There are no significant skin changes overlying the breast.  Abdomen: The abdomen is soft, flat, nontender and nondistended with no palpable masses or organomegaly.  Integumentary: no rashes or skin lesions present  Neurologic: cranial nerves intact, no signs of peripheral neurological deficit, motor/sensory function intact    Assessment:     There is no problem list on file for this patient.       Yasmin was seen today for genetic evaluation.    Diagnoses and all orders for this visit:    At high risk for breast cancer  -     Mammo Digital Screening Bilat w/ Edmundo; Future  -     MRI Breast w/wo Contrast, w/CAD, Bilateral; Future    Monoallelic mutation of DYLAN gene  -     Ambulatory referral/consult to Breast Surgery  -     Mammo Digital Screening Bilat w/ Edmundo; Future  -     MRI Breast w/wo Contrast, w/CAD,  Bilateral; Future    Family history of breast cancer  -     Ambulatory referral/consult to Breast Surgery  -     Mammo Digital Screening Bilat w/ Edmundo; Future  -     MRI Breast w/wo Contrast, w/CAD, Bilateral; Future    Screening mammogram for breast cancer  -     Mammo Digital Screening Bilat w/ Edmundo; Future           --------------------------------------------------------------------------------------------------------------  After the initial clinical evaluation nearly 30 minutes were on counseling the patients regarding the options for management. Risk reduction strategies were discussed.     1. Lifestyle factors: As with other types of cancer, studies continue to show that various lifestyle factors may contribute to the development of breast cancer.     Weight: Recent studies have shown that postmenopausal women who are overweight or obese have an increased risk of breast cancer. These women also have a higher risk of having the cancer come back after treatment.     Physical activity: Decreased physical activity is associated with an increased risk of developing breast cancer and a higher risk of having the cancer come back after treatment. Regular physical activity may protect against breast cancer by helping women maintain a healthy body weight, lowering hormone levels, or causing changes in a womens metabolism or immune factors.     Alcohol: Current research suggests that having more than 1 to 2 alcoholic drinks, including beer, wine, and spirits, per day raises the risk of breast cancer, as well as the risk of having the cancer come back after treatment.     Food: There is no reliable research that confirms that eating or avoiding specific foods reduces the risk of developing breast cancer or having the cancer come back after treatment. However, eating more fruits and vegetables and fewer animal fats is linked with many health benefits.     2. Prevention:  Surgery to lower cancer risk: For women with  "BRCA1 or BRCA2 genetic mutations, which substantially increase the risk of breast cancer, preventive removal of the breasts may be considered. The procedure, called a prophylactic mastectomy, appears to reduce the risk of developing breast cancer by at least 95%. Women with these mutations should also consider the preventive removal of the ovaries and fallopian tubes, called a prophylactic salpingo-oophorectomy. This procedure can reduce the risk of developing ovarian cancer, as well as breast cancer, by stopping the ovaries from making estrogen.      Drugs to lower cancer risk (Chemoprevention): Women who have a higher than usual risk of developing breast cancer may consider certain drugs that may help prevent breast cancer. This approach may also be called "chemoprevention." For breast cancer, this is the use of hormone-blocking drugs to reduce cancer risk. The drugs, tamoxifen (Soltamox) and raloxifene (Evista), are approved by the U.S. Food and Drug Administration (FDA) to lower breast cancer risk. These drugs are called selective estrogen receptor modulators (SERMs) and are not chemotherapy. A SERM is a medication that blocks estrogen receptors in some tissues and not others. Both women who have gone through menopause and those who have not may take tamoxifen. Raloxifene is only approved for women who have gone through menopause. Each drug also has different side effects.     Aromatase inhibitors (AIs) have also been shown to lower breast cancer risk. AIs are a type of hormone-blocking treatment that reduces the amount of estrogen in a woman's body by stopping tissues and organs other than the ovaries from producing estrogen. They can only be used by women who have gone through menopause. However, no AIs have been approved by the FDA for lowering breast cancer risk in women who do not have the disease.     3. Surveillance: Women with a known genetic mutation should follow screening guidelines per the NCCN " guidelines. Women with no known genetic mutation  and found to be at greater than 20 percent average lifetime risk of breast cancer are recommended for the following screening recommendations:     Clinical Breast exam: Every 6-12 months starting at age found to be at increased risk by risk model     Mammogram: Per NCCN guidelines, recommended every year starting 10 years younger than the youngest breast cancer case in the family (but not before age 30). May consider beginning breast MRIs at age 30 per ACR guidelines if desired by patient or other clinical considerations. May also consider getting a baseline MG at time of initial high risk consultation, if not already obtained.     Breast MRI: Per NCCN guidelines, recommended every year starting 10 years younger than the youngest breast cancer case in the family (but not before age 25). May consider beginning breast MRIs at age 30 per ACR guidelines if desired by patient or other clinical considerations. If patient has a first-degree relative with a BRCA1/2 gene mutation, youre encouraged to get genetic counseling and/or testing before getting MRI as part of screening (for those who do not wish to have genetic testing, MRI is recommended). Breast MRI in combination with mammography is better than mammography alone at finding breast cancer in certain women at higher than average risk.     --------------------------------------------------------------------------------------------------------------     Plan:       1. Lifestyle - Healthy lifestyle guidelines were reviewed. She was encouraged to engage in regular exercise, maintain a healthy body weight, and avoid excessive alcohol consumption. Healthy nutritional guidelines were also discussed. Self-breast examination was reviewed with the patient in detail and she was encouraged to perform this on a monthly basis.    2. Surveillance - She desires undergoing high risk screening with annual screening mammograms and  breast MRIs. In the absence of significant clinical findings in the interval, I recommend [facility] MG next available and Breast MRI in Aug 2024. Will follow up with patient after imaging in August.     3. Prevention - We had a brief discussion/education about indications for preventative mastectomy or chemoprevention.  These methods are not recommended to her at this time.    4. 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 Alexandra for pancreatic cancer risk due to DYLAN gene.   - Will refer patient to Dr. Sriram Quigley to establish care and monitor patient for high risk of ovarian cancer due to DYLAN gene.     5. Other routine screening exams:   -  Recommend annual follow up with PCP.   -  Recommend annual follow up with GYN for pap smears/gynecologic exams and CBE.       All of her questions were answered. She was advised to call if she develops any questions or concerns.    Tila Cisse PA-C     --------------------------------------------------------------------------------------------------------------  Total time on the date of the visit ranged from 60-74 mins (04421). Total time includes both face-to-face and non-face-to-face time personally spent by myself on the day of the visit.    Non-face-to-face time included:  _X_ preparing to see the patient such as reviewing the patient record  _X_ obtaining and reviewing separately obtained history  _X_ independently interpreting results  _X_ documenting clinical information in electronic health record.    Face-to-face time included:  _X_ performing an appropriate history and examination  _X_ communicating results to the patient  _X_ counseling and educating the patient  __ ordering appropriate medications  _x_ ordering appropriate tests  _X_ ordering appropriate procedures (including follow-up)  _X_ answering any questions the patient had    Total Time spent on date of visit: 60 minutes

## 2024-01-11 ENCOUNTER — OFFICE VISIT (OUTPATIENT)
Dept: SURGERY | Facility: CLINIC | Age: 64
End: 2024-01-11
Payer: COMMERCIAL

## 2024-01-11 VITALS
DIASTOLIC BLOOD PRESSURE: 77 MMHG | HEART RATE: 78 BPM | TEMPERATURE: 98 F | BODY MASS INDEX: 21.07 KG/M2 | RESPIRATION RATE: 18 BRPM | OXYGEN SATURATION: 98 % | SYSTOLIC BLOOD PRESSURE: 126 MMHG | WEIGHT: 139 LBS | HEIGHT: 68 IN

## 2024-01-11 DIAGNOSIS — Z15.09 MONOALLELIC MUTATION OF ATM GENE: Primary | ICD-10-CM

## 2024-01-11 DIAGNOSIS — Z15.01 MONOALLELIC MUTATION OF ATM GENE: Primary | ICD-10-CM

## 2024-01-11 DIAGNOSIS — Z80.3 FAMILY HISTORY OF BREAST CANCER: ICD-10-CM

## 2024-01-11 DIAGNOSIS — Z12.31 SCREENING MAMMOGRAM FOR BREAST CANCER: ICD-10-CM

## 2024-01-11 DIAGNOSIS — Z15.89 MONOALLELIC MUTATION OF ATM GENE: Primary | ICD-10-CM

## 2024-01-11 DIAGNOSIS — Z91.89 AT HIGH RISK FOR BREAST CANCER: ICD-10-CM

## 2024-01-11 PROCEDURE — 3074F SYST BP LT 130 MM HG: CPT | Mod: CPTII,S$GLB,,

## 2024-01-11 PROCEDURE — 1159F MED LIST DOCD IN RCRD: CPT | Mod: CPTII,S$GLB,,

## 2024-01-11 PROCEDURE — 99205 OFFICE O/P NEW HI 60 MIN: CPT | Mod: S$GLB,,,

## 2024-01-11 PROCEDURE — 3078F DIAST BP <80 MM HG: CPT | Mod: CPTII,S$GLB,,

## 2024-01-11 PROCEDURE — 1160F RVW MEDS BY RX/DR IN RCRD: CPT | Mod: CPTII,S$GLB,,

## 2024-01-11 PROCEDURE — 99999 PR PBB SHADOW E&M-EST. PATIENT-LVL V: CPT | Mod: PBBFAC,,,

## 2024-01-11 PROCEDURE — 3008F BODY MASS INDEX DOCD: CPT | Mod: CPTII,S$GLB,,

## 2024-02-15 ENCOUNTER — HOSPITAL ENCOUNTER (OUTPATIENT)
Dept: RADIOLOGY | Facility: HOSPITAL | Age: 64
Discharge: HOME | End: 2024-02-15
Payer: COMMERCIAL

## 2024-02-15 DIAGNOSIS — Z80.3 FAMILY HISTORY OF BREAST CANCER: ICD-10-CM

## 2024-02-15 DIAGNOSIS — Z12.31 SCREENING MAMMOGRAM FOR BREAST CANCER: ICD-10-CM

## 2024-02-15 DIAGNOSIS — Z15.01 MONOALLELIC MUTATION OF ATM GENE: ICD-10-CM

## 2024-02-15 DIAGNOSIS — Z91.89 AT HIGH RISK FOR BREAST CANCER: ICD-10-CM

## 2024-02-15 DIAGNOSIS — Z15.09 MONOALLELIC MUTATION OF ATM GENE: ICD-10-CM

## 2024-02-15 DIAGNOSIS — Z15.89 MONOALLELIC MUTATION OF ATM GENE: ICD-10-CM

## 2024-02-15 PROCEDURE — 77067 SCR MAMMO BI INCL CAD: CPT | Mod: TC

## 2024-02-15 PROCEDURE — 77067 SCR MAMMO BI INCL CAD: CPT | Mod: 26,,, | Performed by: RADIOLOGY

## 2024-02-15 PROCEDURE — 77063 BREAST TOMOSYNTHESIS BI: CPT | Mod: 26,,, | Performed by: RADIOLOGY

## 2024-04-01 ENCOUNTER — LAB VISIT (OUTPATIENT)
Dept: LAB | Facility: HOSPITAL | Age: 64
End: 2024-04-01
Attending: INTERNAL MEDICINE
Payer: COMMERCIAL

## 2024-04-01 DIAGNOSIS — D72.10 EOSINOPHILIA, UNSPECIFIED TYPE: ICD-10-CM

## 2024-04-01 DIAGNOSIS — Z15.01 MONOALLELIC MUTATION OF ATM GENE: ICD-10-CM

## 2024-04-01 DIAGNOSIS — Z15.89 MONOALLELIC MUTATION OF ATM GENE: ICD-10-CM

## 2024-04-01 DIAGNOSIS — Z15.09 MONOALLELIC MUTATION OF ATM GENE: ICD-10-CM

## 2024-04-01 DIAGNOSIS — R79.89 ABNORMAL CBC: ICD-10-CM

## 2024-04-01 LAB
ALBUMIN SERPL-MCNC: 4.3 G/DL (ref 3.4–4.8)
ALBUMIN/GLOB SERPL: 2 RATIO (ref 1.1–2)
ALP SERPL-CCNC: 61 UNIT/L (ref 40–150)
ALT SERPL-CCNC: 27 UNIT/L (ref 0–55)
AST SERPL-CCNC: 22 UNIT/L (ref 5–34)
BASOPHILS # BLD AUTO: 0.02 X10(3)/MCL
BASOPHILS NFR BLD AUTO: 0.4 %
BILIRUB SERPL-MCNC: 0.4 MG/DL
BUN SERPL-MCNC: 8.8 MG/DL (ref 9.8–20.1)
CALCIUM SERPL-MCNC: 9.7 MG/DL (ref 8.4–10.2)
CHLORIDE SERPL-SCNC: 105 MMOL/L (ref 98–107)
CO2 SERPL-SCNC: 27 MMOL/L (ref 23–31)
CREAT SERPL-MCNC: 0.83 MG/DL (ref 0.55–1.02)
EOSINOPHIL # BLD AUTO: 0 X10(3)/MCL (ref 0–0.9)
EOSINOPHIL NFR BLD AUTO: 0 %
ERYTHROCYTE [DISTWIDTH] IN BLOOD BY AUTOMATED COUNT: 13.4 % (ref 11.5–17)
GFR SERPLBLD CREATININE-BSD FMLA CKD-EPI: >60 MLS/MIN/1.73/M2
GLOBULIN SER-MCNC: 2.2 GM/DL (ref 2.4–3.5)
GLUCOSE SERPL-MCNC: 88 MG/DL (ref 82–115)
HCT VFR BLD AUTO: 37.5 % (ref 37–47)
HGB BLD-MCNC: 12.4 G/DL (ref 12–16)
IGA SERPL-MCNC: 174 MG/DL (ref 69–517)
IGG SERPL-MCNC: 542 MG/DL (ref 522–1631)
IGM SERPL-MCNC: 135 MG/DL (ref 33–293)
IMM GRANULOCYTES # BLD AUTO: 0.01 X10(3)/MCL (ref 0–0.04)
IMM GRANULOCYTES NFR BLD AUTO: 0.2 %
LYMPHOCYTES # BLD AUTO: 2.01 X10(3)/MCL (ref 0.6–4.6)
LYMPHOCYTES NFR BLD AUTO: 36.2 %
MCH RBC QN AUTO: 30.9 PG (ref 27–31)
MCHC RBC AUTO-ENTMCNC: 33.1 G/DL (ref 33–36)
MCV RBC AUTO: 93.5 FL (ref 80–94)
MONOCYTES # BLD AUTO: 0.44 X10(3)/MCL (ref 0.1–1.3)
MONOCYTES NFR BLD AUTO: 7.9 %
NEUTROPHILS # BLD AUTO: 3.07 X10(3)/MCL (ref 2.1–9.2)
NEUTROPHILS NFR BLD AUTO: 55.3 %
PLATELET # BLD AUTO: 347 X10(3)/MCL (ref 130–400)
PMV BLD AUTO: 8.2 FL (ref 7.4–10.4)
POTASSIUM SERPL-SCNC: 4.2 MMOL/L (ref 3.5–5.1)
PROT SERPL-MCNC: 6.5 GM/DL (ref 5.8–7.6)
RBC # BLD AUTO: 4.01 X10(6)/MCL (ref 4.2–5.4)
SODIUM SERPL-SCNC: 140 MMOL/L (ref 136–145)
WBC # SPEC AUTO: 5.55 X10(3)/MCL (ref 4.5–11.5)

## 2024-04-01 PROCEDURE — 85025 COMPLETE CBC W/AUTO DIFF WBC: CPT

## 2024-04-01 PROCEDURE — 80053 COMPREHEN METABOLIC PANEL: CPT

## 2024-04-01 PROCEDURE — 36415 COLL VENOUS BLD VENIPUNCTURE: CPT

## 2024-04-01 PROCEDURE — 82784 ASSAY IGA/IGD/IGG/IGM EACH: CPT

## 2024-04-24 ENCOUNTER — TELEPHONE (OUTPATIENT)
Dept: SURGERY | Facility: CLINIC | Age: 64
End: 2024-04-24
Payer: COMMERCIAL

## 2024-05-01 ENCOUNTER — OFFICE VISIT (OUTPATIENT)
Dept: HEMATOLOGY/ONCOLOGY | Facility: CLINIC | Age: 64
End: 2024-05-01
Payer: COMMERCIAL

## 2024-05-01 VITALS
WEIGHT: 144.75 LBS | DIASTOLIC BLOOD PRESSURE: 81 MMHG | SYSTOLIC BLOOD PRESSURE: 145 MMHG | HEART RATE: 74 BPM | OXYGEN SATURATION: 100 % | BODY MASS INDEX: 21.94 KG/M2 | TEMPERATURE: 98 F | HEIGHT: 68 IN | RESPIRATION RATE: 18 BRPM

## 2024-05-01 DIAGNOSIS — D72.10 EOSINOPHILIA, UNSPECIFIED TYPE: Primary | ICD-10-CM

## 2024-05-01 PROCEDURE — 3008F BODY MASS INDEX DOCD: CPT | Mod: CPTII,S$GLB,, | Performed by: INTERNAL MEDICINE

## 2024-05-01 PROCEDURE — 1159F MED LIST DOCD IN RCRD: CPT | Mod: CPTII,S$GLB,, | Performed by: INTERNAL MEDICINE

## 2024-05-01 PROCEDURE — 99999 PR PBB SHADOW E&M-EST. PATIENT-LVL V: CPT | Mod: PBBFAC,,, | Performed by: INTERNAL MEDICINE

## 2024-05-01 PROCEDURE — 3079F DIAST BP 80-89 MM HG: CPT | Mod: CPTII,S$GLB,, | Performed by: INTERNAL MEDICINE

## 2024-05-01 PROCEDURE — 3077F SYST BP >= 140 MM HG: CPT | Mod: CPTII,S$GLB,, | Performed by: INTERNAL MEDICINE

## 2024-05-01 PROCEDURE — 99213 OFFICE O/P EST LOW 20 MIN: CPT | Mod: S$GLB,,, | Performed by: INTERNAL MEDICINE

## 2024-05-01 PROCEDURE — 1160F RVW MEDS BY RX/DR IN RCRD: CPT | Mod: CPTII,S$GLB,, | Performed by: INTERNAL MEDICINE

## 2024-05-01 RX ORDER — IPRATROPIUM BROMIDE 21 UG/1
SPRAY, METERED NASAL
COMMUNITY
Start: 2024-01-27

## 2024-05-01 NOTE — PROGRESS NOTES
Subjective:       Patient ID: Yasmin Andrade is a 63 y.o. female.    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 Bria Wilson with immunology.  She has always had a slight increase in her eosinophil count, however labs on 10/26/2023 when she was fighting an episode of eosinophilic pneumonia revealed an absolute eosinophil count of 15,400. A week or 2 later, this came down to 7900.  Due to the continued elevation of her eosinophil count she was referred to Hematology.  I saw the patient on 11/29/2023.  At that visit, she stated that she had nausea, but otherwise had no major issue.  Workup at my initial visit with her on 11/29/2023 showed an elevated rheumatoid factor IgM at 6.5 (upper limit of normal is 3.5) along with a positive PASQUALE titer at 1:320.  Follow up testing on 12/04/2023 with double-stranded DNA antibody was normal.  Bone marrow biopsy done on 12/05/2023 showed a normocellular marrow (40%) with sequential trilineage hematopoiesis, benign lymphoid aggregates and no increased eosinophils, dysplasia, or lymphoma.  FISH, next generation sequencing, flow cytometry all demonstrated normal results.  There was a T-cell gene rearrangement present that was clonal, however this does not necessarily indicate the presence of a T-cell neoplasm, and they are particularly likely to occur in settings where there is physiologic restriction of the T-cell repertoire such as autoimmune disorders or with normal aging.     Interval History:   Patient presents to clinic for scheduled follow up appointment.  She is overall feeling well. She feels much better since being on Fasenra.         Past Medical History:   Diagnosis Date    Asthma      Hypothyroidism     Menopause     Migraine     Pregnancy       Past Surgical History:   Procedure Laterality Date    APPENDECTOMY      BONE MARROW BIOPSY N/A 2023    Procedure: Biopsy-bone marrow;  Surgeon: Imer Toney MD;  [facility];  Service: General;  Laterality: N/A;     SECTION      right shoulder sx      sinus sx      TUBAL LIGATION       Social History     Socioeconomic History    Marital status:    Tobacco Use    Smoking status: Never    Smokeless tobacco: Never   Substance and Sexual Activity    Alcohol use: Yes     Alcohol/week: 1.0 standard drink of alcohol     Types: 1 Glasses of wine per week    Drug use: Never    Sexual activity: Yes     Partners: Male     Birth control/protection: None     Social Determinants of Health     Food Insecurity: No Food Insecurity (2023)    Received from [facility] Subsidiaries and Affiliates, [facility] and [facility]    Hunger Vital Sign     Worried About Running Out of Food in the Last Year: Never true     Ran Out of Food in the Last Year: Never true   Stress: No Stress Concern Present (2023)    Received from [facility]sidi[facility] and Affiliates, [facility]sidi[facility] and Affiliates    [place] citizen [place] [employer] - Occupational Stress Questionnaire     Feeling of Stress : Not at all      Family History   Problem Relation Name Age of Onset    Heart attack Mother Pat Cloud     Thyroid disease Mother Pat Cloud     Heart failure Father Irasema Bosch     Dementia Sister      Stroke Maternal Grandfather Abbe Lopez     Heart attack Paternal Grandmother  50    Stomach cancer Paternal Grandfather      Thyroid cancer Daughter  31    Ovarian cancer Daughter  21    Genetic Disease Carrier Daughter          DYLAN+     Genetic Disease Carrier Daughter          DYLAN+    Breast cancer Maternal Cousin  53    Lung cancer Paternal Aunt      Brain cancer Paternal Aunt      Breast cancer Paternal Cousin  60    Leukemia Paternal Cousin  7    Cancer Paternal Cousin      Cancer Paternal Cousin        Review of patient's allergies indicates:   Allergen Reactions    Levofloxacin Anaphylaxis    Morphine Itching and Swelling     Other reaction(s): Angioedema    Penicillanic sulfone bl beta-lactamase inhibitors Anaphylaxis    Penicillins Anaphylaxis      Review of Systems   Constitutional:  Negative for appetite change and unexpected weight change.   HENT:  Negative for mouth sores.    Eyes:  Negative for visual disturbance.   Respiratory:  Negative for cough and shortness of breath.    Cardiovascular:  Negative for chest pain.   Gastrointestinal:  Positive for nausea. Negative for abdominal pain and diarrhea.   Genitourinary:  Negative for frequency.   Musculoskeletal:  Negative for back pain.   Integumentary:  Negative for rash.   Neurological:  Negative for headaches.   Hematological:  Negative for adenopathy.   Psychiatric/Behavioral:  The patient is not nervous/anxious.          Objective:      Physical Exam  Vitals reviewed. Exam conducted with a chaperone present.   Constitutional:       General: She is not in acute distress.     Appearance: Normal appearance.   HENT:      Head: Normocephalic and atraumatic.      Nose: Nose normal.      Mouth/Throat:      Mouth: Mucous membranes are moist.   Eyes:      Extraocular Movements: Extraocular movements intact.      Conjunctiva/sclera: Conjunctivae normal.   Cardiovascular:      Rate and Rhythm: Normal rate and regular rhythm.      Pulses: Normal pulses.      Heart sounds: Normal heart sounds.   Pulmonary:      Effort: Pulmonary effort is normal.      Breath sounds: Normal breath sounds.   Abdominal:      General: Bowel sounds are normal.      Palpations: Abdomen is soft.   Musculoskeletal:          General: No swelling. Normal range of motion.      Cervical back: Normal range of motion and neck supple.      Right lower leg: No edema.      Left lower leg: No edema.   Lymphadenopathy:      Cervical: No cervical adenopathy.   Skin:     General: Skin is warm and dry.   Neurological:      General: No focal deficit present.      Mental Status: She is alert and oriented to person, place, and time. Mental status is at baseline.   Psychiatric:         Mood and Affect: Mood normal.         Behavior: Behavior normal.         LABS AND IMAGING REVIEWED IN EPIC          Assessment:   Eosinophilia with leukocytosis  Hypogammaglobulinemia        Plan:       The patient's bone marrow biopsy returned without evidence of reason for her eosinophilia from a bone marrow perspective. Of note, the patient does see Dr. Alexandra, underwent EGD and colonoscopy in 10/2023, this did not reveal any abnormalities.    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 Alexandra and will also be referred to high-risk breast cancer clinic.    Porphyria workup negative.     Return to clinic in 3 months with CBC, CMP, QIMGs.    Kevin Gillespie II, MD

## 2024-05-22 DIAGNOSIS — K80.20 CHOLELITHIASIS: Primary | ICD-10-CM

## 2024-05-22 RX ORDER — FEXOFENADINE HYDROCHLORIDE 180 MG/1
180 TABLET, FILM COATED ORAL EVERY MORNING
COMMUNITY
Start: 2024-04-30

## 2024-05-23 ENCOUNTER — LAB VISIT (OUTPATIENT)
Dept: LAB | Facility: HOSPITAL | Age: 64
End: 2024-05-23
Attending: SURGERY
Payer: COMMERCIAL

## 2024-05-23 ENCOUNTER — PATIENT MESSAGE (OUTPATIENT)
Dept: ADMINISTRATIVE | Facility: OTHER | Age: 64
End: 2024-05-23
Payer: COMMERCIAL

## 2024-05-23 ENCOUNTER — OFFICE VISIT (OUTPATIENT)
Dept: SURGERY | Facility: CLINIC | Age: 64
End: 2024-05-23
Payer: COMMERCIAL

## 2024-05-23 VITALS
TEMPERATURE: 99 F | HEART RATE: 88 BPM | HEIGHT: 68 IN | WEIGHT: 139 LBS | SYSTOLIC BLOOD PRESSURE: 133 MMHG | DIASTOLIC BLOOD PRESSURE: 83 MMHG | BODY MASS INDEX: 21.07 KG/M2

## 2024-05-23 DIAGNOSIS — Z01.818 PREOP EXAMINATION: Primary | ICD-10-CM

## 2024-05-23 DIAGNOSIS — K80.50 CALCULUS OF BILE DUCT WITHOUT CHOLECYSTITIS AND WITHOUT OBSTRUCTION: Primary | ICD-10-CM

## 2024-05-23 DIAGNOSIS — K80.20 CHOLELITHIASIS: ICD-10-CM

## 2024-05-23 DIAGNOSIS — Z01.818 PREOP EXAMINATION: ICD-10-CM

## 2024-05-23 LAB
ALBUMIN SERPL-MCNC: 4.3 G/DL (ref 3.4–4.8)
ALBUMIN/GLOB SERPL: 1.8 RATIO (ref 1.1–2)
ALP SERPL-CCNC: 57 UNIT/L (ref 40–150)
ALT SERPL-CCNC: 17 UNIT/L (ref 0–55)
ANION GAP SERPL CALC-SCNC: 10 MEQ/L
AST SERPL-CCNC: 14 UNIT/L (ref 5–34)
BASOPHILS # BLD AUTO: 0.02 X10(3)/MCL
BASOPHILS NFR BLD AUTO: 0.4 %
BILIRUB SERPL-MCNC: 0.5 MG/DL
BUN SERPL-MCNC: 8.5 MG/DL (ref 9.8–20.1)
CALCIUM SERPL-MCNC: 9.3 MG/DL (ref 8.4–10.2)
CHLORIDE SERPL-SCNC: 106 MMOL/L (ref 98–107)
CO2 SERPL-SCNC: 27 MMOL/L (ref 23–31)
CREAT SERPL-MCNC: 0.82 MG/DL (ref 0.55–1.02)
CREAT/UREA NIT SERPL: 10
EOSINOPHIL # BLD AUTO: 0 X10(3)/MCL (ref 0–0.9)
EOSINOPHIL NFR BLD AUTO: 0 %
ERYTHROCYTE [DISTWIDTH] IN BLOOD BY AUTOMATED COUNT: 12.8 % (ref 11.5–17)
GFR SERPLBLD CREATININE-BSD FMLA CKD-EPI: >60 ML/MIN/1.73/M2
GLOBULIN SER-MCNC: 2.4 GM/DL (ref 2.4–3.5)
GLUCOSE SERPL-MCNC: 89 MG/DL (ref 82–115)
HCT VFR BLD AUTO: 40.8 % (ref 37–47)
HGB BLD-MCNC: 13.2 G/DL (ref 12–16)
IMM GRANULOCYTES # BLD AUTO: 0.01 X10(3)/MCL (ref 0–0.04)
IMM GRANULOCYTES NFR BLD AUTO: 0.2 %
LYMPHOCYTES # BLD AUTO: 1.62 X10(3)/MCL (ref 0.6–4.6)
LYMPHOCYTES NFR BLD AUTO: 29.5 %
MCH RBC QN AUTO: 31.7 PG (ref 27–31)
MCHC RBC AUTO-ENTMCNC: 32.4 G/DL (ref 33–36)
MCV RBC AUTO: 97.8 FL (ref 80–94)
MONOCYTES # BLD AUTO: 0.43 X10(3)/MCL (ref 0.1–1.3)
MONOCYTES NFR BLD AUTO: 7.8 %
NEUTROPHILS # BLD AUTO: 3.42 X10(3)/MCL (ref 2.1–9.2)
NEUTROPHILS NFR BLD AUTO: 62.1 %
NRBC BLD AUTO-RTO: 0 %
PLATELET # BLD AUTO: 331 X10(3)/MCL (ref 130–400)
PMV BLD AUTO: 9.3 FL (ref 7.4–10.4)
POTASSIUM SERPL-SCNC: 3.8 MMOL/L (ref 3.5–5.1)
PROT SERPL-MCNC: 6.7 GM/DL (ref 5.8–7.6)
RBC # BLD AUTO: 4.17 X10(6)/MCL (ref 4.2–5.4)
SODIUM SERPL-SCNC: 143 MMOL/L (ref 136–145)
WBC # SPEC AUTO: 5.5 X10(3)/MCL (ref 4.5–11.5)

## 2024-05-23 PROCEDURE — 80053 COMPREHEN METABOLIC PANEL: CPT

## 2024-05-23 PROCEDURE — 99214 OFFICE O/P EST MOD 30 MIN: CPT | Mod: 57,ICN,, | Performed by: SURGERY

## 2024-05-23 PROCEDURE — 85025 COMPLETE CBC W/AUTO DIFF WBC: CPT

## 2024-05-23 PROCEDURE — 93010 ELECTROCARDIOGRAM REPORT: CPT | Mod: ,,, | Performed by: STUDENT IN AN ORGANIZED HEALTH CARE EDUCATION/TRAINING PROGRAM

## 2024-05-23 PROCEDURE — 93005 ELECTROCARDIOGRAM TRACING: CPT

## 2024-05-23 PROCEDURE — 36415 COLL VENOUS BLD VENIPUNCTURE: CPT

## 2024-05-24 NOTE — H&P
[facility] Surgical - General Surgery Services  [address] [place] [id number]  Phone: [phone]  Fax: [phone]     HISTORY & PHYSICAL  General Surgery  Dr. Jeremiah Dixon      Patient Name: Yasmin Andrade  YOB: 1960  Author: Jeremiah Dixon MD     Date: 05/24/2024                   SUBJECTIVE:     Chief Complaint/Reason for Admission:   Chief Complaint   Patient presents with    Consult     Cholelithiasis        History of Present Illness:  Ms. Yasmin Andrade is a 63 y.o. female who presents to clinic for surgical evaluation of gallbladder diease. Workup revealed cholelithiasis.     Positive symptoms:   Abdominal Pain RUQ and Epigastric, Nausea, Abdominal Bloating, Belching, Heartburn, and Dyspepsia    Referring provider: Avila Alexandra MD     Patient Care Team:  Janine Jeronimo MD as PCP - General (Internal Medicine)  Jeremiah Dixon MD as Surgeon (General Surgery)  Avila Alexandra MD as Consulting Physician (Gastroenterology)  Mark Howe MD (Cardiology)     Pertinent workup:  Abd US: gallstones    CT Chest Without Contrast  Narrative: EXAMINATION:  CT CHEST WITHOUT CONTRAST    CLINICAL HISTORY:  Lung nodule, > 8mm; Solitary pulmonary nodule    TECHNIQUE:  Low dose axial images, sagittal and coronal reformations were obtained from the thoracic inlet to the lung bases. Contrast was not administered.    COMPARISON:  CT chest 10/23/2023, 05/31/2023, 10/13/2022    FINDINGS:  Images of the upper abdomen demonstrate atherosclerosis.  No free intraperitoneal air.  The esophagus is unremarkable.  No lytic or blastic lesion.    Mild aortic atherosclerosis.  No coronary artery calcifications.  Trace pericardial fluid.  No supraclavicular, axillary mediastinal adenopathy.  The tracheobronchial tree is patent without endobronchial lesion.    Pulmonary architecture is stable.  Right upper lobe ground-glass opacity measures 13 mm compared to 12 mm.   This opacity measured approximately 12 mm on 2022.  The right middle and right lower lobes are clear.  The left lung is clear.  No confluent airspace opacity or mass.  There is no pneumothorax.  No pleural fluid.  Impression: Similar 13 mm ground-glass nodule in the right upper lobe.  CT chest follow-up recommended in 6-12 months according to Fleischner criteria.    Electronically signed by: Luc Farhan  Date:    2024  Time:    08:30      Review of Systems:  12 point ROS negative except as stated in HPI    PAST HISTORY:     Past Medical History:   Diagnosis Date    Asthma     DYLAN gene mutation positive     Dysphagia     Hypothyroidism     Kidney stone     Menopause     Migraine     Pancreatic cyst     Pregnancy      Past Surgical History:   Procedure Laterality Date    APPENDECTOMY      BONE MARROW BIOPSY N/A 2023    Procedure: Biopsy-bone marrow;  Surgeon: Imer Toney MD;  Location: [facility];  Service: General;  Laterality: N/A;     SECTION      EGD, WITH BALLOON DILATION      LITHOTRIPSY  10/2017    right shoulder sx      sinus sx      TUBAL LIGATION       Family History   Problem Relation Name Age of Onset    Heart attack Mother Paige Bosch     Thyroid disease Mother Paige Bosch     Heart failure Father Irasema Bosch     Dementia Sister      [facility]     Heart attack Paternal Grandmother  50    Stomach cancer Paternal Grandfather      Thyroid cancer Daughter  31    Ovarian cancer Daughter  21    Genetic Disease Carrier Daughter          DYLAN+    Genetic Disease Carrier Daughter          DYLAN+    Breast cancer Maternal Cousin  53    Lung cancer Paternal Aunt      Brain cancer Paternal Aunt      Breast cancer Paternal Cousin  60    Leukemia Paternal Cousin  7    Cancer Paternal Cousin      Cancer Paternal Cousin       Social History     Socioeconomic History    Marital status:    Tobacco Use    Smoking status: Never    Smokeless  tobacco: Never   Substance and Sexual Activity    Alcohol use: Yes     Alcohol/week: 1.0 standard drink of alcohol     Types: 1 Glasses of wine per week    Drug use: Never    Sexual activity: Yes     Partners: Male     Birth control/protection: None     Social Determinants of Health     Financial Resource Strain: Low Risk  (5/22/2024)    Overall Financial Resource Strain (CARDIA)     Difficulty of Paying Living Expenses: Not hard at all   Food Insecurity: No Food Insecurity (5/22/2024)    Hunger Vital Sign     Worried About Running Out of Food in the Last Year: Never true     Ran Out of Food in the Last Year: Never true   Physical Activity: Sufficiently Active (5/22/2024)    Exercise Vital Sign     Days of Exercise per Week: 4 days     Minutes of Exercise per Session: 40 min   Stress: No Stress Concern Present (5/22/2024)    [place] [employer] - Occupational Stress Questionnaire     Feeling of Stress : Only a little   Housing Stability: Unknown (5/22/2024)    Housing Stability Vital Sign     Unable to Pay for Housing in the Last Year: No       MEDICATIONS & ALLERGIES:     No current facility-administered medications on file prior to visit.     Current Outpatient Medications on File Prior to Visit   Medication Sig    ALLERGY RELIEF, FEXOFENADINE, 180 mg tablet Take 180 mg by mouth every morning.    BREO ELLIPTA 200-25 mcg/dose DsDv diskus inhaler INHALE 1 PUFF BY MOUTH ONCE A DAY    FASENRA PEN 30 mg/mL AtIn Inject 30 mg into the skin every 8 weeks.    ipratropium (ATROVENT) 21 mcg (0.03 %) nasal spray SPRAY 2 SPRAY(S) INTRANASALLY 3 TIMES A DAY    levothyroxine (SYNTHROID) 50 MCG tablet Take 50 mcg by mouth once daily.    montelukast (SINGULAIR) 10 mg tablet Take 10 mg by mouth once daily.    nitroGLYCERIN (NITROSTAT) 0.4 MG SL tablet 25    ondansetron (ZOFRAN-ODT) 4 MG TbDL Take 4 mg by mouth 3 (three) times daily.    promethazine (PHENERGAN) 12.5 MG Tab Take 12.5 mg by mouth.    SPIRIVA  "RESPIMAT 1.25 mcg/actuation inhaler INHALE TWO PUFFS ONCE A DAY    tolterodine (DETROL LA) 4 MG 24 hr capsule 90    topiramate (TOPAMAX) 50 MG tablet Take 50 mg by mouth once daily.     Review of patient's allergies indicates:   Allergen Reactions    Levaquin [levofloxacin] Anaphylaxis    Morphine Itching and Swelling     Other reaction(s): Angioedema    Penicillanic sulfone bl beta-lactamase inhibitors Anaphylaxis    Penicillins Anaphylaxis       OBJECTIVE:     Vitals:    05/23/24 1012   BP: 133/83   Pulse: 88   Temp: 98.5 °F (36.9 °C)   Weight: 63 kg (139 lb)   Height: 5' 8" (1.727 m)     Body mass index is 21.13 kg/m².    Physical Exam:  General:  Well developed, well nourished, no acute distress  HEENT:  Normocephalic, atraumatic, PERRL, EOMI, clear sclera, neck supple  CVS:  RRR, S1 and S2 normal, no murmurs, rubs, gallops  Resp:  Lungs clear to auscultation, no wheezes, rales, rhonchi, cough  GI:  Abdomen soft, non-tender, non-distended, normoactive bowel sounds, no masses  :   Deferred  MSK:  No muscle atrophy, cyanosis, peripheral edema, full range of motion  Skin:  No rashes, ulcers, erythema  Neuro:  CNII-XII grossly intact  Psych:  Alert and oriented to person, place, and time    Results:  I have independently reviewed all pertinent lab and radiologic studies relevant to general surgery.      VISIT DIAGNOSES:       ICD-10-CM ICD-9-CM   1. Preop examination  Z01.818 V72.84   2. Cholelithiasis  K80.20 574.20       ASSESSMENT/PLAN:   Ms. Yasmin Andrade is a 63 y.o. female with symptomatic cholelithiasis     Plan:  - Laparoscopic Cholecystectomy   - Possible Intraoperative Cholangiogram, Possible Hepatic Biopsy, and other indicated procedures. Operative findings to dictate procedures performed.   - Pre op labs, EKG  - Dr. Dixon examined patient, discussed recommendations, discussed risks/benefits of procedure, obtained informed consent, and answered all questions.       - Counseling included " differential diagnoses, treatment options, risks and benefits, lifestyle changes, prognosis, and medications.    The patient was interactive, and verbalized understanding.    Jeremiah Dixon MD

## 2024-05-26 ENCOUNTER — PATIENT MESSAGE (OUTPATIENT)
Dept: ADMINISTRATIVE | Facility: OTHER | Age: 64
End: 2024-05-26
Payer: COMMERCIAL

## 2024-05-27 LAB
OHS QRS DURATION: 88 MS
OHS QTC CALCULATION: 410 MS

## 2024-06-05 NOTE — PROGRESS NOTES
Patient ID: [id number]     Chief Complaint: Post-op Evaluation (Lap allyn 24)    HPI:     Yasmin Andrade is a 63 y.o. female here today for postoperative visit of lap cholecystectomy on 2024. Pt's incisions are healing well, denies any abd pain. having normal bowel movements, passing flatus, no NVD. no constipation.     CHRONIC CHOLECYSTITIS WITH CHOLELITHIASIS.      ONE LYMPH NODE WITH LIPOGRANULOMA.     Pathology Results  (Last 10 years)                 24 0643  Specimen to Pathology Final result    23 1120  Specimen to Pathology, Bone Marrow Aspiration/Biopsy Final result            Patient Care Team:  Janine Jeronimo MD as PCP - General (Internal Medicine)  Jeremiah Dixon MD as Surgeon (General Surgery)  Avila Alexandra MD as Consulting Physician (Gastroenterology)  Mark Howe MD (Cardiology)     Past Medical History:   Diagnosis Date    Allergy     Asthma     DYLAN gene mutation positive     Dysphagia     Hypothyroidism     Kidney stone     Menopause     Migraine     Pancreatic cyst     Pregnancy         Past Surgical History:   Procedure Laterality Date    APPENDECTOMY      BONE MARROW BIOPSY N/A 2023    Procedure: Biopsy-bone marrow;  Surgeon: Imer Toney MD;  Location: [facility] OR;  Service: General;  Laterality: N/A;     SECTION      CHOLECYSTECTOMY  2024    EGD, WITH BALLOON DILATION      LAPAROSCOPIC CHOLECYSTECTOMY N/A 2024    Procedure: CHOLECYSTECTOMY, LAPAROSCOPIC;  Surgeon: Jeremiah Dixon MD;  Location: [facility] OR;  Service: General;  Laterality: N/A;    LITHOTRIPSY  10/2017    right shoulder sx      sinus sx      TONSILLECTOMY      TUBAL LIGATION          Social History     Tobacco Use    Smoking status: Never    Smokeless tobacco: Never   Substance and Sexual Activity    Alcohol use: Yes     Alcohol/week: 1.0 standard drink of alcohol     Types: 1 Glasses of wine per week    Drug use: Never    Sexual activity: Yes      "Partners: Male     Birth control/protection: None        Current Outpatient Medications   Medication Instructions    ALLERGY RELIEF (FEXOFENADINE) 180 mg, Oral, Every morning    BREO ELLIPTA 200-25 mcg/dose DsDv diskus inhaler INHALE 1 PUFF BY MOUTH ONCE A DAY    FASENRA PEN 30 mg, Subcutaneous, Every 8 weeks    HYDROcodone-acetaminophen (NORCO) 5-325 mg per tablet 1 tablet, Oral, Every 6 hours PRN    ipratropium (ATROVENT) 21 mcg (0.03 %) nasal spray SPRAY 2 SPRAY(S) INTRANASALLY 3 TIMES A DAY    levothyroxine (SYNTHROID) 50 mcg, Oral, Daily    montelukast (SINGULAIR) 10 mg, Oral, Daily    nitroGLYCERIN (NITROSTAT) 0.4 MG SL tablet 25    ondansetron (ZOFRAN-ODT) 4 mg, Oral, 3 times daily    promethazine (PHENERGAN) 12.5 mg, Oral    SPIRIVA RESPIMAT 1.25 mcg/actuation inhaler INHALE TWO PUFFS ONCE A DAY    tolterodine (DETROL LA) 4 MG 24 hr capsule 90    topiramate (TOPAMAX) 50 mg, Oral, Daily       Review of patient's allergies indicates:   Allergen Reactions    Levaquin [levofloxacin] Anaphylaxis    Morphine Itching and Swelling     Other reaction(s): Angioedema    Penicillanic sulfone bl beta-lactamase inhibitors Anaphylaxis    Penicillins Anaphylaxis        Subjective:     Review of Systems    12 point review of systems conducted, negative except as stated in the history of present illness. See HPI for details.    Objective:     Visit Vitals  /73   Pulse 76   Ht 5' 8" (1.727 m)   Wt 63 kg (139 lb)   BMI 21.13 kg/m²       Physical Exam:  General:  Alert and oriented.    Respiratory:  Lungs are clear to auscultation, Respirations are non-labored, Breath sounds are equal.    Cardiovascular:  Normal rate, Regular rhythm, No murmur.    Gastrointestinal:  Soft, Non-tender, Non-distended, Normal bowel sounds        Musculoskeletal:  Normal range of motion, Normal strength.    Integumentary:  Warm, Dry, Pink.  Lap incisions healing well, no redness, swelling, or drainage noted.   Neurologic:  Alert, Oriented.  "   Psychiatric:  Cooperative.      Assessment:     1. Postoperative visit            Plan:     1. Postoperative visit       - Incisions healing well   - ok to take adhesive off with adhesive remover   - ok to get in tub, swim, wash with any soap, and still no lifting >15# x's 2 weeks.  - ok to advance diet as tolerated   - ED precautions given to patient and is to call the office immediately or go to the emergency room if she notices any redness, swelling, severe pain, increase nausea/vomiting, fever, irregular bowel movements or severe diarrhea      Future Appointments   Date Time Provider Department Center   6/7/2024  8:45 AM [facility] [place]    7/31/2024  3:00 PM LAB, [facility] OLB LAB [facility]   8/5/2024  1:00 PM [facility] MRI1 420 LB LIMIT [facility] MRI [facility]   8/7/2024  3:20 PM [facility]   8/12/2024  3:40 PM Tila Herrera PA-C [facility] BSR [place]    4/30/2025 11:00 AM [facility] CT1 500 LB LIMIT [facility]SCN [facility] Bro   5/20/2025 10:20 AM Jesús Avina MD [facility] GBR AN[facility]Y [place]

## 2024-06-07 ENCOUNTER — OFFICE VISIT (OUTPATIENT)
Dept: SURGERY | Facility: CLINIC | Age: 64
End: 2024-06-07
Payer: MEDICARE

## 2024-06-07 VITALS
BODY MASS INDEX: 21.07 KG/M2 | HEIGHT: 68 IN | WEIGHT: 139 LBS | HEART RATE: 76 BPM | SYSTOLIC BLOOD PRESSURE: 116 MMHG | DIASTOLIC BLOOD PRESSURE: 73 MMHG

## 2024-06-07 DIAGNOSIS — Z48.89 POSTOPERATIVE VISIT: Primary | ICD-10-CM

## 2024-06-07 PROCEDURE — 99024 POSTOP FOLLOW-UP VISIT: CPT | Mod: POP,,, | Performed by: NURSE PRACTITIONER

## 2024-08-05 ENCOUNTER — APPOINTMENT (OUTPATIENT)
Dept: RADIOLOGY | Facility: HOSPITAL | Age: 64
End: 2024-08-05
Payer: COMMERCIAL

## 2024-08-05 DIAGNOSIS — Z15.89 MONOALLELIC MUTATION OF ATM GENE: ICD-10-CM

## 2024-08-05 DIAGNOSIS — Z80.3 FAMILY HISTORY OF BREAST CANCER: ICD-10-CM

## 2024-08-05 DIAGNOSIS — Z15.09 MONOALLELIC MUTATION OF ATM GENE: ICD-10-CM

## 2024-08-05 DIAGNOSIS — Z91.89 AT HIGH RISK FOR BREAST CANCER: ICD-10-CM

## 2024-08-05 DIAGNOSIS — Z15.01 MONOALLELIC MUTATION OF ATM GENE: ICD-10-CM

## 2024-08-05 PROCEDURE — 25500020 PHARM REV CODE 255

## 2024-08-05 PROCEDURE — 77049 MRI BREAST C-+ W/CAD BI: CPT | Mod: TC

## 2024-08-05 PROCEDURE — 77049 MRI BREAST C-+ W/CAD BI: CPT | Mod: 26,,, | Performed by: RADIOLOGY

## 2024-08-05 PROCEDURE — A9577 INJ MULTIHANCE: HCPCS

## 2024-08-05 RX ADMIN — GADOBENATE DIMEGLUMINE 13 ML: 529 INJECTION, SOLUTION INTRAVENOUS at 01:08

## 2024-08-07 ENCOUNTER — PATIENT MESSAGE (OUTPATIENT)
Dept: HEMATOLOGY/ONCOLOGY | Facility: CLINIC | Age: 64
End: 2024-08-07
Payer: COMMERCIAL

## 2024-08-07 ENCOUNTER — PATIENT MESSAGE (OUTPATIENT)
Dept: SURGERY | Facility: CLINIC | Age: 64
End: 2024-08-07
Payer: COMMERCIAL

## 2024-08-12 ENCOUNTER — OFFICE VISIT (OUTPATIENT)
Dept: SURGERY | Facility: CLINIC | Age: 64
End: 2024-08-12
Payer: COMMERCIAL

## 2024-08-12 VITALS
SYSTOLIC BLOOD PRESSURE: 123 MMHG | OXYGEN SATURATION: 98 % | HEART RATE: 75 BPM | WEIGHT: 143 LBS | BODY MASS INDEX: 21.67 KG/M2 | HEIGHT: 68 IN | RESPIRATION RATE: 18 BRPM | DIASTOLIC BLOOD PRESSURE: 79 MMHG | TEMPERATURE: 98 F

## 2024-08-12 DIAGNOSIS — Z15.01 MONOALLELIC MUTATION OF ATM GENE: ICD-10-CM

## 2024-08-12 DIAGNOSIS — Z15.89 MONOALLELIC MUTATION OF ATM GENE: ICD-10-CM

## 2024-08-12 DIAGNOSIS — Z12.31 SCREENING MAMMOGRAM FOR BREAST CANCER: ICD-10-CM

## 2024-08-12 DIAGNOSIS — Z80.3 FAMILY HISTORY OF BREAST CANCER: ICD-10-CM

## 2024-08-12 DIAGNOSIS — Z15.09 MONOALLELIC MUTATION OF ATM GENE: ICD-10-CM

## 2024-08-12 DIAGNOSIS — Z91.89 AT HIGH RISK FOR BREAST CANCER: ICD-10-CM

## 2024-08-12 DIAGNOSIS — Z76.89 ENCOUNTER TO ESTABLISH CARE WITH NEW DOCTOR: Primary | ICD-10-CM

## 2024-08-12 PROCEDURE — 99999 PR PBB SHADOW E&M-EST. PATIENT-LVL V: CPT | Mod: PBBFAC,,,

## 2024-08-12 NOTE — PROGRESS NOTES
[facility] Breast Surg  Breast Surgical Oncology  Est Patient Office Visit       Referring Provider: Tila Herrera  PCP: Janine Jeronimo MD   Care Team:  OBGYN: No data on file.    Chief Complaint:   Chief Complaint   Patient presents with    Follow-up     Patient reports no breast related concerns        Subjective:     Interval:  08/12/2024 Patient here for six-month high-risk follow-up.  She is doing well today.  She currently denies any new breast complaints today.  She underwent screening mammogram in February which resulted as negative, and she underwent breast MRI in August which resulted as benign.  Patient is still seeing Dr. 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  Patient is still having difficulty getting established with a gynecologist who will accept her insurance.  Thus, she is not currently being monitored for ovarian cancer at this time.  Patient is also currently being seen by Dr. 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  In the interim, patient states her daughter with history of ovarian cancer and thyroid cancer found lump in her breast.  The lump is thought to be benign on imaging but she is being followed closely at [facility].  Patient is otherwise doing okay and denies any other significant interval changes or any other changes in family history.  She has no other questions or concerns today.    HPI:  Yasmin Andrade is a 63 y.o. female who presents on 1/11/2024 for evaluation of risk for breast cancer. Patient was recently diagnosed with the DYLAN gene which, according to NCCN, puts her at a 20-30% lifetime risk of developing breast cancer.     Patient is doing well today. Patient states she had a small lump under her left, medial breast that has been here for about 2 years now. She states this lump was previously imaged when she lived in [place], and it was found to be benign. She  states it has not changed in size and is not causing her any pain or discomfort at this time. She currently denies any other breast issues including rashes, redness, pain, swelling, nipple discharge, or new lumps/masses. Patient's last screening mammogram was done in 2022 which resulted as negative. Patient states she does perform self-breast exams.  She states she has been living a healthy lifestyle. She does not smoke and she drinks alcohol in moderation.    Of note, patient has 2 daughters also tested positive for DYLAN gene.  One of her daughters was diagnosed with ovarian cancer at the age of 21 and most recently diagnosed with thyroid cancer at the age of 31.      Imaging:   10/10/2022 [facility] MG - There is no mammographic evidence of malignancy. BI-RADS: 1 Negative   02/15/2024 [facility] MG - There is no mammographic evidence of malignancy. BI-RADS: 1 Negative   2024 Breast MRI - No MR evidence of malignancy in either breast. BI-RADS: 2 Benign     Pathology:   none    OB/GYN History:  Age at Menarche Onset: 14  Menopausal Status: postmenopausal, LMP: No LMP recorded. Patient is postmenopausal.  Hysterectomy/Oophorectomy: menopause, at age 59  Hormonal birth control (duration): none  Pregnancy History:   Age at first live birth: 26  Hormone Replacement Therapy: No, none    Other:  MG breast density: BIRADS B   Prior thoracic RT: none  Genetic testing: Yes. 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*)   Ashkenazi Gnosticism descent: No    Family History:  Family History   Problem Relation Name Age of Onset    Heart attack Mother Pat Cloud     Thyroid disease Mother Pat Cloud     Heart failure Father Rainniel Ringgold     Heart disease Father Rainniel Cloud     Dementia Sister      Stroke Maternal Grandfather Abbe Lopez     Heart attack Paternal Grandmother  50    Stomach cancer Paternal Grandfather      Thyroid cancer Daughter Daylin Andrade 31    Ovarian cancer Daughter Daylin  Andrade 21    Genetic Disease Carrier Daughter Daylin Andrade         DYLAN+    Cancer Daughter Daylin Andrade         Ovarian and Thyroid    Genetic Disease Carrier Daughter          DYLAN+    Breast cancer Maternal Cousin  53    Lung cancer Paternal Aunt      Brain cancer Paternal Aunt      Breast cancer Paternal Cousin  60    Leukemia Paternal Cousin  7    Cancer Paternal Cousin      Cancer Paternal Cousin          Patient History:  Past Medical History:   Diagnosis Date    Allergy     Asthma     DYLAN gene mutation positive     Dysphagia     Hypothyroidism     Kidney stone     Menopause     Migraine     Pancreatic cyst     Pregnancy        Past Surgical History:   Procedure Laterality Date    APPENDECTOMY      BONE MARROW BIOPSY N/A 2023    Procedure: Biopsy-bone marrow;  Surgeon: Imer Toney MD;  Location: [facility] OR;  Service: General;  Laterality: N/A;     SECTION      CHOLECYSTECTOMY  2024    EGD, WITH BALLOON DILATION      LAPAROSCOPIC CHOLECYSTECTOMY N/A 2024    Procedure: CHOLECYSTECTOMY, LAPAROSCOPIC;  Surgeon: Jeremiah Dixon MD;  Location: [facility] OR;  Service: General;  Laterality: N/A;    LITHOTRIPSY  10/2017    right shoulder sx      sinus sx      TONSILLECTOMY      TUBAL LIGATION         Social History     Socioeconomic History    Marital status:    Tobacco Use    Smoking status: Never    Smokeless tobacco: Never   Substance and Sexual Activity    Alcohol use: Yes     Alcohol/week: 1.0 standard drink of alcohol     Types: 1 Glasses of wine per week    Drug use: Never    Sexual activity: Yes     Partners: Male     Birth control/protection: None     Social Determinants of Health     Financial Resource Strain: Low Risk  (2024)    Overall Financial Resource Strain (CARDIA)     Difficulty of Paying Living Expenses: Not hard at all   Food Insecurity: No Food Insecurity (2024)    Hunger Vital Sign     Worried About Running Out of Food in the Last Year: Never true      Ran Out of Food in the Last Year: Never true   Physical Activity: Sufficiently Active (5/22/2024)    Exercise Vital Sign     Days of Exercise per Week: 4 days     Minutes of Exercise per Session: 40 min   Stress: No Stress Concern Present (5/22/2024)    [employer] [place] [employer] - Occupational Stress Questionnaire     Feeling of Stress : Only a little   Housing Stability: Unknown (5/22/2024)    Housing Stability Vital Sign     Unable to Pay for Housing in the Last Year: No         There is no immunization history on file for this patient.    Medications/Allergies:    Current Outpatient Medications:     ALLERGY RELIEF, FEXOFENADINE, 180 mg tablet, Take 180 mg by mouth every morning., Disp: , Rfl:     BREO ELLIPTA 200-25 mcg/dose DsDv diskus inhaler, INHALE 1 PUFF BY MOUTH ONCE A DAY, Disp: , Rfl:     FASENRA PEN 30 mg/mL AtIn, Inject 30 mg into the skin every 8 weeks., Disp: 1 mL, Rfl: 6    ipratropium (ATROVENT) 21 mcg (0.03 %) nasal spray, SPRAY 2 SPRAY(S) INTRANASALLY 3 TIMES A DAY, Disp: , Rfl:     levothyroxine (SYNTHROID) 50 MCG tablet, Take 50 mcg by mouth once daily., Disp: , Rfl:     montelukast (SINGULAIR) 10 mg tablet, Take 10 mg by mouth once daily., Disp: , Rfl:     nitroGLYCERIN (NITROSTAT) 0.4 MG SL tablet, 25, Disp: , Rfl:     SPIRIVA RESPIMAT 1.25 mcg/actuation inhaler, INHALE TWO PUFFS ONCE A DAY, Disp: , Rfl:     topiramate (TOPAMAX) 50 MG tablet, Take 50 mg by mouth once daily., Disp: , Rfl:     ondansetron (ZOFRAN-ODT) 4 MG TbDL, Take 4 mg by mouth 3 (three) times daily. (Patient not taking: Reported on 8/12/2024), Disp: , Rfl:     promethazine (PHENERGAN) 12.5 MG Tab, Take 12.5 mg by mouth. (Patient not taking: Reported on 8/12/2024), Disp: , Rfl:     tolterodine (DETROL LA) 4 MG 24 hr capsule, 90 (Patient not taking: Reported on 8/12/2024), Disp: , Rfl:      Review of patient's allergies indicates:   Allergen Reactions    Levaquin [levofloxacin] Anaphylaxis    Morphine Itching  "and Swelling     Other reaction(s): Angioedema    Penicillanic sulfone bl beta-lactamase inhibitors Anaphylaxis    Penicillins Anaphylaxis       Review of Systems:  All pertinent history mentioned in HPI.     Objective:     Vitals:  Vitals:    08/12/24 1531   BP: 123/79   BP Location: Right arm   Patient Position: Sitting   BP Method: Medium (Automatic)   Pulse: 75   Resp: 18   Temp: 98.1 °F (36.7 °C)   TempSrc: Oral   SpO2: 98%   Weight: 64.9 kg (143 lb)   Height: 5' 8" (1.727 m)         Body mass index is 21.74 kg/m².     Physical Exam:  General: The patient is awake, alert and oriented times three. The patient is well nourished and in no acute distress.  Neck: There is no evidence of palpable cervical, supraclavicular or axillary adenopathy. The neck is supple. The thyroid is not enlarged.  Musculoskeletal: The patient has a normal range of motion of her bilateral upper extremities.  Chest: Examination of the chest wall fails to reveal any obvious abnormalities.  The lungs are clear to auscultation bilaterally without rales, rhonchi, or wheezing.  Cardiovascular: The heart has a regular rate and rhythm without murmurs, gallops or rubs.  Breast:   Right:   Examination of right breast fails to reveal any dominant masses or areas of significant focal nodularity. The nipple is everted without evidence of discharge. There is no skin dimpling with movement of the pectoralis. There is no significant skin changes overlying the breast.   Left:   Examination of the left breast reveals a hard, well circumscribed 1cm lump in the medial inframammary fold. The nipple is everted without evidence of discharge. There is no skin dimpling with movement of the pectoralis. There are no significant skin changes overlying the breast.  Abdomen: The abdomen is soft, flat, nontender and nondistended with no palpable masses or organomegaly.  Integumentary: no rashes or skin lesions present  Neurologic: cranial nerves intact, no signs of " "peripheral neurological deficit, motor/sensory function intact    Assessment:     There is no problem list on file for this patient.       Yasmin Walton" was seen today for follow-up.    Diagnoses and all orders for this visit:    At high risk for breast cancer  -     Mammo Digital Screening Bilat w/ Edmundo; Future  -     US BREAST SCREENING BILATERAL; Future    Screening mammogram for breast cancer  -     Mammo Digital Screening Bilat w/ Edmundo; Future    Family history of breast cancer  -     Mammo Digital Screening Bilat w/ Edmundo; Future  -     US BREAST SCREENING BILATERAL; Future    Monoallelic mutation of DYLAN gene  -     Mammo Digital Screening Bilat w/ Edmundo; Future  -     US BREAST SCREENING BILATERAL; Future             --------------------------------------------------------------------------------------------------------------  After the initial clinical evaluation nearly 30 minutes were on counseling the patients regarding the options for management. Risk reduction strategies were discussed.     1. Lifestyle factors: As with other types of cancer, studies continue to show that various lifestyle factors may contribute to the development of breast cancer.     Weight: Recent studies have shown that postmenopausal women who are overweight or obese have an increased risk of breast cancer. These women also have a higher risk of having the cancer come back after treatment.     Physical activity: Decreased physical activity is associated with an increased risk of developing breast cancer and a higher risk of having the cancer come back after treatment. Regular physical activity may protect against breast cancer by helping women maintain a healthy body weight, lowering hormone levels, or causing changes in a womens metabolism or immune factors.     Alcohol: Current research suggests that having more than 1 to 2 alcoholic drinks, including beer, wine, and spirits, per day raises the risk of breast cancer, as well as the " "risk of having the cancer come back after treatment.     Food: There is no reliable research that confirms that eating or avoiding specific foods reduces the risk of developing breast cancer or having the cancer come back after treatment. However, eating more fruits and vegetables and fewer animal fats is linked with many health benefits.     2. Prevention:  Surgery to lower cancer risk: For women with BRCA1 or BRCA2 genetic mutations, which substantially increase the risk of breast cancer, preventive removal of the breasts may be considered. The procedure, called a prophylactic mastectomy, appears to reduce the risk of developing breast cancer by at least 95%. Women with these mutations should also consider the preventive removal of the ovaries and fallopian tubes, called a prophylactic salpingo-oophorectomy. This procedure can reduce the risk of developing ovarian cancer, as well as breast cancer, by stopping the ovaries from making estrogen.      Drugs to lower cancer risk (Chemoprevention): Women who have a higher than usual risk of developing breast cancer may consider certain drugs that may help prevent breast cancer. This approach may also be called "chemoprevention." For breast cancer, this is the use of hormone-blocking drugs to reduce cancer risk. The drugs, tamoxifen (Soltamox) and raloxifene (Evista), are approved by the U.S. Food and Drug Administration (FDA) to lower breast cancer risk. These drugs are called selective estrogen receptor modulators (SERMs) and are not chemotherapy. A SERM is a medication that blocks estrogen receptors in some tissues and not others. Both women who have gone through menopause and those who have not may take tamoxifen. Raloxifene is only approved for women who have gone through menopause. Each drug also has different side effects.     Aromatase inhibitors (AIs) have also been shown to lower breast cancer risk. AIs are a type of hormone-blocking treatment that reduces the " amount of estrogen in a woman's body by stopping tissues and organs other than the ovaries from producing estrogen. They can only be used by women who have gone through menopause. However, no AIs have been approved by the FDA for lowering breast cancer risk in women who do not have the disease.     3. Surveillance: Women with a known genetic mutation should follow screening guidelines per the NCCN guidelines. Women with no known genetic mutation  and found to be at greater than 20 percent average lifetime risk of breast cancer are recommended for the following screening recommendations:     Clinical Breast exam: Every 6-12 months starting at age found to be at increased risk by risk model     Mammogram: Per NCCN guidelines, recommended every year starting 10 years younger than the youngest breast cancer case in the family (but not before age 30). May consider beginning breast MRIs at age 30 per ACR guidelines if desired by patient or other clinical considerations. May also consider getting a baseline MG at time of initial high risk consultation, if not already obtained.     Breast MRI: Per NCCN guidelines, recommended every year starting 10 years younger than the youngest breast cancer case in the family (but not before age 25). May consider beginning breast MRIs at age 30 per ACR guidelines if desired by patient or other clinical considerations. If patient has a first-degree relative with a BRCA1/2 gene mutation, youre encouraged to get genetic counseling and/or testing before getting MRI as part of screening (for those who do not wish to have genetic testing, MRI is recommended). Breast MRI in combination with mammography is better than mammography alone at finding breast cancer in certain women at higher than average risk.     --------------------------------------------------------------------------------------------------------------     Plan:       1. Lifestyle - Healthy lifestyle guidelines were reviewed.  She was encouraged to engage in regular exercise, maintain a healthy body weight, and avoid excessive alcohol consumption. Healthy nutritional guidelines were also discussed. Self-breast examination was reviewed with the patient in detail and she was encouraged to perform this on a monthly basis.    2. Surveillance - She desires undergoing high risk screening with annual screening mammograms and breast MRIs.  Being that patient's imaging came back benign this year, she would like to alternate breast MRIs every other year.  Instead of undergoing breast MRI next year, she would like to undergo screening mammogram with ultrasound.  I recommend next screening mammogram with ultrasound in February 2025.  RTC in 6 months for CBE.    3. Prevention - We had a brief discussion/education about indications for preventative mastectomy or chemoprevention.  These methods are not recommended to her at this time.    4. 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 Alexandra for pancreatic cancer risk due to DYLAN gene.   -  Will refer patient to Dr. Stephanie Alicea to establish care and monitor patient for high risk of ovarian cancer due to DYLAN gene.     5. Other routine screening exams:   -  Recommend annual follow up with PCP.   -  Recommend annual follow up with GYN for pap smears/gynecologic exams and CBE.       All of her questions were answered. She was advised to call if she develops any questions or concerns.    Tila Herrera PA-C      --------------------------------------------------------------------------------------------------------------    Total time on the date of the visit ranged from 30-39 mins (00284). Total time includes both face-to-face and non-face-to-face time personally spent by myself on the day of the visit.    Non-face-to-face time included:  _X_ preparing to see the patient such as reviewing the patient record  __ obtaining and  reviewing separately obtained history  _X_ independently interpreting results  _X_ documenting clinical information in electronic health record.    Face-to-face time included:  _X_ performing an appropriate history and examination  _X_ communicating results to the patient  _X_ counseling and educating the patient  __ ordering appropriate medications  __ ordering appropriate tests  _X_ ordering appropriate procedures (including follow-up)  _X_ answering any questions the patient had    Total Time spent on date of visit: 35 minutes

## 2024-11-11 ENCOUNTER — OFFICE VISIT (OUTPATIENT)
Dept: URGENT CARE | Facility: CLINIC | Age: 64
End: 2024-11-11
Payer: COMMERCIAL

## 2024-11-11 VITALS
DIASTOLIC BLOOD PRESSURE: 87 MMHG | HEART RATE: 91 BPM | OXYGEN SATURATION: 99 % | WEIGHT: 143 LBS | SYSTOLIC BLOOD PRESSURE: 128 MMHG | HEIGHT: 68 IN | BODY MASS INDEX: 21.67 KG/M2 | TEMPERATURE: 99 F | RESPIRATION RATE: 17 BRPM

## 2024-11-11 DIAGNOSIS — J45.901 MILD ASTHMA WITH EXACERBATION, UNSPECIFIED WHETHER PERSISTENT: Primary | ICD-10-CM

## 2024-11-11 PROCEDURE — 96372 THER/PROPH/DIAG INJ SC/IM: CPT | Mod: ,,, | Performed by: FAMILY MEDICINE

## 2024-11-11 PROCEDURE — 99213 OFFICE O/P EST LOW 20 MIN: CPT | Mod: 25,,, | Performed by: FAMILY MEDICINE

## 2024-11-11 RX ORDER — PREDNISONE 10 MG/1
30 TABLET ORAL DAILY
Qty: 15 TABLET | Refills: 0 | Status: SHIPPED | OUTPATIENT
Start: 2024-11-11 | End: 2024-11-16

## 2024-11-11 RX ORDER — DOXYCYCLINE 100 MG/1
100 CAPSULE ORAL 2 TIMES DAILY
Qty: 14 CAPSULE | Refills: 0 | Status: SHIPPED | OUTPATIENT
Start: 2024-11-11 | End: 2024-11-18

## 2024-11-11 NOTE — PROGRESS NOTES
"Subjective:      Patient ID: Yasmin Andrade is a 64 y.o. female.    Vitals:  height is 5' 8" (1.727 m) and weight is 64.9 kg (143 lb). Her oral temperature is 98.6 °F (37 °C). Her blood pressure is 128/87 and her pulse is 91. Her respiration is 17 and oxygen saturation is 99%.     Chief Complaint: Cough    Patient is a 64 y.o. female who presents to urgent care with complaints of sore throat, body aches, productive cough, SOB, fever, fever blister inside mouth x 3 days.  T-max 101°.  States fever has resolved.  Patient states she thinks it is an asthma flare-up. Alleviating factors include valtrex, neb tx, asthma pump, rx nasal spray with mild amount of relief.        Constitution: Negative.   HENT: Negative.     Cardiovascular: Negative.    Eyes: Negative.    Respiratory:  Positive for cough, shortness of breath and wheezing.    Gastrointestinal: Negative.    Genitourinary: Negative.    Musculoskeletal: Negative.    Skin: Negative.    Allergic/Immunologic: Negative.    Neurological: Negative.    Hematologic/Lymphatic: Negative.       Objective:     Physical Exam   Constitutional: She is oriented to person, place, and time. She appears well-developed. She is cooperative.  Non-toxic appearance. She does not appear ill. No distress.   HENT:   Head: Normocephalic and atraumatic.   Ears:   Right Ear: Hearing and external ear normal.   Left Ear: Hearing and external ear normal.   Nose: No rhinorrhea or congestion.   Mouth/Throat: Oropharynx is clear and moist and mucous membranes are normal. No oropharyngeal exudate or posterior oropharyngeal erythema (postnasal drip).   Eyes: Conjunctivae and lids are normal.   Neck: Trachea normal and phonation normal. Neck supple. No edema present. No erythema present. No neck rigidity present.   Cardiovascular: Normal rate.   Pulmonary/Chest: Effort normal and breath sounds normal. No stridor. No respiratory distress. She has no decreased breath sounds. She has no wheezes. She " has no rhonchi. She has no rales.   Abdominal: Normal appearance.   Neurological: She is alert and oriented to person, place, and time. She exhibits normal muscle tone. Coordination normal.   Skin: Skin is warm, dry, intact, not diaphoretic and no rash.   Psychiatric: Her speech is normal and behavior is normal. Mood, judgment and thought content normal.   Nursing note and vitals reviewed.         Previous History      Review of patient's allergies indicates:   Allergen Reactions    Levaquin [levofloxacin] Anaphylaxis    Morphine Itching and Swelling     Other reaction(s): Angioedema    Penicillanic sulfone bl beta-lactamase inhibitors Anaphylaxis    Penicillins Anaphylaxis       Past Medical History:   Diagnosis Date    Allergy     Asthma     DYLAN gene mutation positive     Dysphagia     Hypothyroidism     Kidney stone     Menopause     Migraine     Pancreatic cyst     Pregnancy 1985     Current Outpatient Medications   Medication Instructions    ALLERGY RELIEF (FEXOFENADINE) 180 mg, Oral, Every morning    BREO ELLIPTA 200-25 mcg/dose DsDv diskus inhaler INHALE 1 PUFF BY MOUTH ONCE A DAY    doxycycline (MONODOX) 100 mg, Oral, 2 times daily    FASENRA PEN 30 mg, Subcutaneous, Every 8 weeks    ipratropium (ATROVENT) 21 mcg (0.03 %) nasal spray SPRAY 2 SPRAY(S) INTRANASALLY 3 TIMES A DAY    levothyroxine (SYNTHROID) 50 mcg, Oral, Daily    montelukast (SINGULAIR) 10 mg, Oral, Daily    nitroGLYCERIN (NITROSTAT) 0.4 MG SL tablet 25    ondansetron (ZOFRAN-ODT) 4 mg, 3 times daily    predniSONE (DELTASONE) 30 mg, Oral, Daily    promethazine (PHENERGAN) 12.5 mg    SPIRIVA RESPIMAT 1.25 mcg/actuation inhaler INHALE TWO PUFFS ONCE A DAY    tolterodine (DETROL LA) 4 MG 24 hr capsule 90    topiramate (TOPAMAX) 50 mg, Oral, Daily     Past Surgical History:   Procedure Laterality Date    APPENDECTOMY  1982    BONE MARROW BIOPSY N/A 12/05/2023    Procedure: Biopsy-bone marrow;  Surgeon: Imer Toney MD;  Location: [facility] OR;   "Service: General;  Laterality: N/A;     SECTION      CHOLECYSTECTOMY  2024    EGD, WITH BALLOON DILATION      LAPAROSCOPIC CHOLECYSTECTOMY N/A 2024    Procedure: CHOLECYSTECTOMY, LAPAROSCOPIC;  Surgeon: Jeremiah Dixon MD;  Location: [facility] OR;  Service: General;  Laterality: N/A;    LITHOTRIPSY  10/2017    right shoulder sx      sinus sx      TONSILLECTOMY      TUBAL LIGATION       Family History   Problem Relation Name Age of Onset    Heart attack Mother Pat Cloud     Thyroid disease Mother Pat Cloud     Heart failure Father Rainniel [place]     Heart disease Father Julianiel Cloud     Dementia Sister      Stroke Maternal Grandfather Monticello Lopez     Heart attack Paternal Grandmother  50    Stomach cancer Paternal Grandfather      Thyroid cancer Daughter Daylin Andrade 31    Ovarian cancer Daughter Daylin Andrade 21    Genetic Disease Carrier Daughter Daylin Andrade         DYLAN+    Cancer Daughter Daylin Andrade         Ovarian and Thyroid    Genetic Disease Carrier Daughter          DYLAN+    Breast cancer Maternal Cousin  53    Lung cancer Paternal Aunt      Brain cancer Paternal Aunt      Breast cancer Paternal Cousin  60    Leukemia Paternal Cousin  7    Cancer Paternal Cousin      Cancer Paternal Cousin         Social History     Tobacco Use    Smoking status: Never    Smokeless tobacco: Never   Substance Use Topics    Alcohol use: Yes     Alcohol/week: 1.0 standard drink of alcohol     Types: 1 Glasses of wine per week    Drug use: Never        Physical Exam      Vital Signs Reviewed   /87   Pulse 91   Temp 98.6 °F (37 °C) (Oral)   Resp 17   Ht 5' 8" (1.727 m)   Wt 64.9 kg (143 lb)   SpO2 99%   BMI 21.74 kg/m²        Procedures    Procedures     Labs     Results for orders placed or performed during the hospital encounter of 24   Specimen to Pathology    Collection Time: 24  6:43 AM   Result Value Ref Range    Pathology Result         Assessment:     1. Mild asthma with " exacerbation, unspecified whether persistent        Plan:   Medications sent to pharmacy  Start antibiotics today  Start oral steroids tomorrow  Continue albuterol inhaler and nebs as needed q.4 for shortness of breath or wheezing  Monitor for fever  Rest and hydrate  As discussed if your shortness of breath worsens, your fever returns, or your cough worsens return to clinic or seek medical attention immediately    Mild asthma with exacerbation, unspecified whether persistent    Other orders  -     methylPREDNISolone sod suc(PF) injection 125 mg  -     predniSONE (DELTASONE) 10 MG tablet; Take 3 tablets (30 mg total) by mouth once daily. for 5 days  Dispense: 15 tablet; Refill: 0  -     doxycycline (MONODOX) 100 MG capsule; Take 1 capsule (100 mg total) by mouth 2 (two) times daily. for 7 days  Dispense: 14 capsule; Refill: 0

## 2024-11-11 NOTE — PATIENT INSTRUCTIONS
Plan:   Medications sent to pharmacy  Start antibiotics today  Start oral steroids tomorrow  Continue albuterol inhaler and nebs as needed q.4 for shortness of breath or wheezing  Monitor for fever  Rest and hydrate  As discussed if your shortness of breath worsens, your fever returns, or your cough worsens return to clinic or seek medical attention immediately

## 2024-11-19 ENCOUNTER — LAB REQUISITION (OUTPATIENT)
Dept: LAB | Facility: HOSPITAL | Age: 64
End: 2024-11-19
Payer: COMMERCIAL

## 2024-11-19 DIAGNOSIS — K86.2 CYST OF PANCREAS: ICD-10-CM

## 2024-11-19 DIAGNOSIS — Z15.01 GENETIC SUSCEPTIBILITY TO MALIGNANT NEOPLASM OF BREAST: ICD-10-CM

## 2024-11-19 DIAGNOSIS — K21.9 GASTRO-ESOPHAGEAL REFLUX DISEASE WITHOUT ESOPHAGITIS: ICD-10-CM

## 2024-11-19 LAB — CANCER AG19-9 SERPL-ACNC: 5.32 UNIT/ML (ref 0–37)

## 2024-11-19 PROCEDURE — 86301 IMMUNOASSAY TUMOR CA 19-9: CPT | Performed by: INTERNAL MEDICINE

## 2024-11-22 ENCOUNTER — OFFICE VISIT (OUTPATIENT)
Dept: URGENT CARE | Facility: CLINIC | Age: 64
End: 2024-11-22
Payer: COMMERCIAL

## 2024-11-22 VITALS
BODY MASS INDEX: 21.22 KG/M2 | OXYGEN SATURATION: 99 % | HEART RATE: 95 BPM | RESPIRATION RATE: 18 BRPM | TEMPERATURE: 98 F | SYSTOLIC BLOOD PRESSURE: 122 MMHG | DIASTOLIC BLOOD PRESSURE: 85 MMHG | HEIGHT: 68 IN | WEIGHT: 140 LBS

## 2024-11-22 DIAGNOSIS — R05.9 COUGH, UNSPECIFIED TYPE: Primary | ICD-10-CM

## 2024-11-22 RX ORDER — CODEINE PHOSPHATE AND GUAIFENESIN 10; 100 MG/5ML; MG/5ML
5 SOLUTION ORAL 3 TIMES DAILY PRN
Qty: 118 ML | Refills: 0 | Status: SHIPPED | OUTPATIENT
Start: 2024-11-22 | End: 2024-12-02

## 2024-11-22 RX ORDER — CLINDAMYCIN HYDROCHLORIDE 300 MG/1
300 CAPSULE ORAL EVERY 8 HOURS
Qty: 30 CAPSULE | Refills: 0 | Status: SHIPPED | OUTPATIENT
Start: 2024-11-22 | End: 2024-12-02

## 2024-11-22 RX ORDER — PREDNISONE 10 MG/1
10 TABLET ORAL 2 TIMES DAILY
Qty: 10 TABLET | Refills: 0 | Status: SHIPPED | OUTPATIENT
Start: 2024-11-22 | End: 2024-11-27

## 2024-11-22 RX ORDER — BENZONATATE 200 MG/1
200 CAPSULE ORAL 3 TIMES DAILY PRN
Qty: 21 CAPSULE | Refills: 0 | Status: SHIPPED | OUTPATIENT
Start: 2024-11-22 | End: 2024-12-02

## 2024-11-22 RX ORDER — ONDANSETRON 4 MG/1
4 TABLET, ORALLY DISINTEGRATING ORAL EVERY 8 HOURS PRN
Qty: 15 TABLET | Refills: 0 | Status: SHIPPED | OUTPATIENT
Start: 2024-11-22

## 2024-11-22 NOTE — PROGRESS NOTES
"Subjective:      Patient ID: Yasmin Andrade is a 64 y.o. female.    Vitals:  height is 5' 8" (1.727 m) and weight is 63.5 kg (140 lb). Her temperature is 98 °F (36.7 °C). Her blood pressure is 122/85 and her pulse is 95. Her respiration is 18 and oxygen saturation is 99%.     Chief Complaint: Cough     Patient is a 64 y.o. female who presents to urgent care with complaints of cough, headache, wheezing, nausea, maxillary facial pressure, and diarrhea. Patient was seen on 11/11.    Patient finished predniSONE (DELTASONE) 10 MG tablet and doxycycline (MONODOX) 100 MG capsule. Also had  methylPREDNISolone sod suc(PF) injection 125 mg at visit.       Cough      Respiratory:  Positive for cough.       Objective:     Physical Exam   Constitutional: She is oriented to person, place, and time. She appears well-developed. She is cooperative.  Non-toxic appearance. She does not appear ill. No distress.   HENT:   Head: Normocephalic and atraumatic.   Ears:   Right Ear: Hearing, tympanic membrane, external ear and ear canal normal.   Left Ear: Hearing, tympanic membrane, external ear and ear canal normal.   Nose: Nose normal. No nasal deformity. No epistaxis.   Mouth/Throat: Uvula is midline, oropharynx is clear and moist and mucous membranes are normal. No trismus in the jaw. Normal dentition. No uvula swelling. No oropharyngeal exudate, posterior oropharyngeal edema or posterior oropharyngeal erythema.   Eyes: Conjunctivae and lids are normal. No scleral icterus.   Neck: Trachea normal and phonation normal. Neck supple. No edema present. No erythema present. No neck rigidity present.   Cardiovascular: Normal rate, regular rhythm, normal heart sounds and normal pulses.   Pulmonary/Chest: Effort normal and breath sounds normal. No respiratory distress. She has no decreased breath sounds. She has no rhonchi.   Abdominal: Normal appearance.   Musculoskeletal: Normal range of motion.         General: No deformity. Normal range " of motion.   Neurological: She is alert and oriented to person, place, and time. She exhibits normal muscle tone. Coordination normal.   Skin: Skin is warm, dry, intact, not diaphoretic and not pale.   Psychiatric: Her speech is normal and behavior is normal. Judgment and thought content normal.   Nursing note and vitals reviewed.         Previous History      Review of patient's allergies indicates:   Allergen Reactions    Levaquin [levofloxacin] Anaphylaxis    Morphine Itching and Swelling     Other reaction(s): Angioedema    Penicillanic sulfone bl beta-lactamase inhibitors Anaphylaxis    Penicillins Anaphylaxis       Past Medical History:   Diagnosis Date    Allergy     Asthma     DYLAN gene mutation positive     Dysphagia     Hypothyroidism     Kidney stone     Menopause     Migraine     Pancreatic cyst     Pregnancy 1985     Current Outpatient Medications   Medication Instructions    ALLERGY RELIEF (FEXOFENADINE) 180 mg, Every morning    benzonatate (TESSALON) 200 mg, Oral, 3 times daily PRN    BREO ELLIPTA 200-25 mcg/dose DsDv diskus inhaler INHALE 1 PUFF BY MOUTH ONCE A DAY    clindamycin (CLEOCIN) 300 mg, Oral, Every 8 hours    FASENRA PEN 30 mg, Subcutaneous, Every 8 weeks    guaiFENesin-codeine 100-10 mg/5 ml (TUSSI-ORGANIDIN NR)  mg/5 mL syrup 5 mLs, Oral, 3 times daily PRN    ipratropium (ATROVENT) 21 mcg (0.03 %) nasal spray SPRAY 2 SPRAY(S) INTRANASALLY 3 TIMES A DAY    levothyroxine (SYNTHROID) 50 mcg, Daily    montelukast (SINGULAIR) 10 mg, Daily    nitroGLYCERIN (NITROSTAT) 0.4 MG SL tablet 25    ondansetron (ZOFRAN-ODT) 4 mg, 3 times daily    ondansetron (ZOFRAN-ODT) 4 mg, Oral, Every 8 hours PRN    predniSONE (DELTASONE) 10 mg, Oral, 2 times daily    promethazine (PHENERGAN) 12.5 mg    SPIRIVA RESPIMAT 1.25 mcg/actuation inhaler INHALE TWO PUFFS ONCE A DAY    tolterodine (DETROL LA) 4 MG 24 hr capsule 90    topiramate (TOPAMAX) 50 mg, Daily     Past Surgical History:   Procedure Laterality  "Date    APPENDECTOMY      BONE MARROW BIOPSY N/A 2023    Procedure: Biopsy-bone marrow;  Surgeon: Imer Toney MD;  Location: [facility] OR;  Service: General;  Laterality: N/A;     SECTION      CHOLECYSTECTOMY  2024    EGD, WITH BALLOON DILATION      LAPAROSCOPIC CHOLECYSTECTOMY N/A 2024    Procedure: CHOLECYSTECTOMY, LAPAROSCOPIC;  Surgeon: Jeremiah Dixon MD;  Location: [facility] OR;  Service: General;  Laterality: N/A;    LITHOTRIPSY  10/2017    right shoulder sx      sinus sx      TONSILLECTOMY      TUBAL LIGATION       Family History   Problem Relation Name Age of Onset    Heart attack Mother Pat Cloud     Thyroid disease Mother Pat Cloud     Heart failure Father Rainniel [place]     Heart disease Father Rainniel Cloud     Dementia Sister      Stroke Maternal Grandfather Abbe Lopez     Heart attack Paternal Grandmother  50    Stomach cancer Paternal Grandfather      Thyroid cancer Daughter Daylin Andrade 31    Ovarian cancer Daughter Daylin Andrade 21    Genetic Disease Carrier Daughter Daylin Andrade         DYLAN+    Cancer Daughter Daylin Andrade         Ovarian and Thyroid    Genetic Disease Carrier Daughter          DYLAN+    Breast cancer Maternal Cousin  53    Lung cancer Paternal Aunt      Brain cancer Paternal Aunt      Breast cancer Paternal Cousin  60    Leukemia Paternal Cousin  7    Cancer Paternal Cousin      Cancer Paternal Cousin         Social History     Tobacco Use    Smoking status: Never    Smokeless tobacco: Never   Substance Use Topics    Alcohol use: Yes     Alcohol/week: 1.0 standard drink of alcohol     Types: 1 Glasses of wine per week    Drug use: Never        Physical Exam      Vital Signs Reviewed   /85   Pulse 95   Temp 98 °F (36.7 °C)   Resp 18   Ht 5' 8" (1.727 m)   Wt 63.5 kg (140 lb)   SpO2 99%   BMI 21.29 kg/m²        Procedures    Procedures     Labs     Results for orders placed or performed in visit on 24   Cancer Antigen 19-9    Collection " Time: 11/19/24 11:03 AM   Result Value Ref Range    CA 19-9 5.32 0.00 - 37.00 unit/mL         Details    Reading Physician Reading Date Result Priority   Noel Stallings MD  [phone] 11/22/2024 STAT     Narrative & Impression  EXAMINATION:  XR CHEST PA AND LATERAL     CLINICAL HISTORY:  Cough, unspecified     TECHNIQUE:  Two-view     COMPARISON:  October 2, 2023.     FINDINGS:  Cardiopericardial silhouette is within normal limits.  No dense focal or segmental consolidation, congestion, pleural effusion or pneumothorax.     Impression:     No acute cardiopulmonary process identified.        Electronically signed by:Noel Stallings  Date:                                            11/22/2024  Time:                                           12:51  Assessment:     1. Cough, unspecified type        Plan:       Cough, unspecified type  -     X-Ray Chest PA And Lateral; Future; Expected date: 11/22/2024    Other orders  -     clindamycin (CLEOCIN) 300 MG capsule; Take 1 capsule (300 mg total) by mouth every 8 (eight) hours. for 10 days  Dispense: 30 capsule; Refill: 0  -     predniSONE (DELTASONE) 10 MG tablet; Take 1 tablet (10 mg total) by mouth 2 (two) times daily. for 5 days  Dispense: 10 tablet; Refill: 0  -     benzonatate (TESSALON) 200 MG capsule; Take 1 capsule (200 mg total) by mouth 3 (three) times daily as needed for Cough.  Dispense: 21 capsule; Refill: 0  -     guaiFENesin-codeine 100-10 mg/5 ml (TUSSI-ORGANIDIN NR)  mg/5 mL syrup; Take 5 mLs by mouth 3 (three) times daily as needed for Cough.  Dispense: 118 mL; Refill: 0  -     ondansetron (ZOFRAN-ODT) 4 MG TbDL; Take 1 tablet (4 mg total) by mouth every 8 (eight) hours as needed (nausea).  Dispense: 15 tablet; Refill: 0      Drink plenty of fluids.     Get plenty of rest.     Probiotic as discussed.     Go to the ER with any significant change or worsening of symptoms.     Follow up with your primary care doctor.

## 2024-11-25 ENCOUNTER — TELEPHONE (OUTPATIENT)
Dept: PULMONOLOGY | Facility: HOSPITAL | Age: 64
End: 2024-11-25
Payer: COMMERCIAL

## 2024-11-25 NOTE — TELEPHONE ENCOUNTER
[facility]  [address] [place] [id number]  [phone]      Pulmonary Surgical Risk Assessment      Patient Name:  Yasmin Andrade  YOB: 1960  Medical Record Number:  [id number]      Pulmonary Diagnosis:  Eosinophilic asthma    Planned Surgery:  Lynx sling     Last Office Visit:  April 30, 2024       Current medications:   Current Outpatient Medications   Medication Instructions    ALLERGY RELIEF (FEXOFENADINE) 180 mg, Every morning    benzonatate (TESSALON) 200 mg, Oral, 3 times daily PRN    BREO ELLIPTA 200-25 mcg/dose DsDv diskus inhaler INHALE 1 PUFF BY MOUTH ONCE A DAY    clindamycin (CLEOCIN) 300 mg, Oral, Every 8 hours    FASENRA PEN 30 mg, Subcutaneous, Every 8 weeks    guaiFENesin-codeine 100-10 mg/5 ml (TUSSI-ORGANIDIN NR)  mg/5 mL syrup 5 mLs, Oral, 3 times daily PRN    ipratropium (ATROVENT) 21 mcg (0.03 %) nasal spray SPRAY 2 SPRAY(S) INTRANASALLY 3 TIMES A DAY    levothyroxine (SYNTHROID) 50 mcg, Daily    montelukast (SINGULAIR) 10 mg, Daily    nitroGLYCERIN (NITROSTAT) 0.4 MG SL tablet 25    ondansetron (ZOFRAN-ODT) 4 mg, 3 times daily    ondansetron (ZOFRAN-ODT) 4 mg, Oral, Every 8 hours PRN    predniSONE (DELTASONE) 10 mg, Oral, 2 times daily    promethazine (PHENERGAN) 12.5 mg    SPIRIVA RESPIMAT 1.25 mcg/actuation inhaler INHALE TWO PUFFS ONCE A DAY    tolterodine (DETROL LA) 4 MG 24 hr capsule 90    topiramate (TOPAMAX) 50 mg, Daily       Patient is stratified to be at low risk of pulmonary complications for the above planned surgery.  Proceed with surgery if patient accepts the risks.    Additional comments:             Jesús Avina MD  Pulmonary Critical Care Medicine

## 2025-02-21 ENCOUNTER — HOSPITAL ENCOUNTER (OUTPATIENT)
Dept: RADIOLOGY | Facility: HOSPITAL | Age: 65
Discharge: HOME | End: 2025-02-21
Payer: COMMERCIAL

## 2025-02-21 DIAGNOSIS — Z12.31 SCREENING MAMMOGRAM FOR BREAST CANCER: ICD-10-CM

## 2025-02-21 DIAGNOSIS — Z80.3 FAMILY HISTORY OF BREAST CANCER: ICD-10-CM

## 2025-02-21 DIAGNOSIS — Z15.89 MONOALLELIC MUTATION OF ATM GENE: ICD-10-CM

## 2025-02-21 DIAGNOSIS — Z91.89 AT HIGH RISK FOR BREAST CANCER: ICD-10-CM

## 2025-02-21 DIAGNOSIS — Z15.01 MONOALLELIC MUTATION OF ATM GENE: ICD-10-CM

## 2025-02-21 DIAGNOSIS — Z15.09 MONOALLELIC MUTATION OF ATM GENE: ICD-10-CM

## 2025-02-21 PROCEDURE — 76641 ULTRASOUND BREAST COMPLETE: CPT | Mod: 26,,, | Performed by: RADIOLOGY

## 2025-02-21 PROCEDURE — 76641 ULTRASOUND BREAST COMPLETE: CPT | Mod: TC,50

## 2025-02-21 PROCEDURE — 77067 SCR MAMMO BI INCL CAD: CPT | Mod: 26,,, | Performed by: RADIOLOGY

## 2025-02-21 PROCEDURE — 77063 BREAST TOMOSYNTHESIS BI: CPT | Mod: TC

## 2025-02-21 PROCEDURE — 77063 BREAST TOMOSYNTHESIS BI: CPT | Mod: 26,,, | Performed by: RADIOLOGY

## 2025-02-21 NOTE — PROGRESS NOTES
Subjective:       Patient ID: Yasmin Andrade is a 64 y.o. female.    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 Bria Wilson with immunology.  She has always had a slight increase in her eosinophil count, however labs on 10/26/2023 when she was fighting an episode of eosinophilic pneumonia revealed an absolute eosinophil count of 15,400. A week or 2 later, this came down to 7900.  Due to the continued elevation of her eosinophil count she was referred to Hematology.  I saw the patient on 11/29/2023.  At that visit, she stated that she had nausea, but otherwise had no major issue.  Workup at my initial visit with her on 11/29/2023 showed an elevated rheumatoid factor IgM at 6.5 (upper limit of normal is 3.5) along with a positive PASQUALE titer at 1:320.  Follow up testing on 12/04/2023 with double-stranded DNA antibody was normal.  Bone marrow biopsy done on 12/05/2023 showed a normocellular marrow (40%) with sequential trilineage hematopoiesis, benign lymphoid aggregates and no increased eosinophils, dysplasia, or lymphoma.  FISH, next generation sequencing, flow cytometry all demonstrated normal results.  There was a T-cell gene rearrangement present that was clonal, however this does not necessarily indicate the presence of a T-cell neoplasm, and they are particularly likely to occur in settings where there is physiologic restriction of the T-cell repertoire such as autoimmune disorders or with normal aging.     Interval History:   Patient presents to clinic for scheduled follow up appointment.  She is overall feeling well. She feels much better since being on Fasenra.  She has not had any major issues.  She denies any fevers, chills, or  any other overt signs of infection.        Past Medical History:   Diagnosis Date    Allergy     Asthma     DYLAN gene mutation positive     Dysphagia     Hypothyroidism     Kidney stone     Menopause     Migraine     Pancreatic cyst     Pregnancy       Past Surgical History:   Procedure Laterality Date    APPENDECTOMY      BONE MARROW BIOPSY N/A 2023    Procedure: Biopsy-bone marrow;  Surgeon: Imer Toney MD;  Location: [facility] OR;  Service: General;  Laterality: N/A;     SECTION      CHOLECYSTECTOMY  2024    EGD, WITH BALLOON DILATION      LAPAROSCOPIC CHOLECYSTECTOMY N/A 2024    Procedure: CHOLECYSTECTOMY, LAPAROSCOPIC;  Surgeon: Jeremiah Dixon MD;  Location: [facility] OR;  Service: General;  Laterality: N/A;    LITHOTRIPSY  10/2017    right shoulder sx      sinus sx      TONSILLECTOMY      TUBAL LIGATION       Social History     Socioeconomic History    Marital status:    Tobacco Use    Smoking status: Never    Smokeless tobacco: Never   Substance and Sexual Activity    Alcohol use: Yes     Alcohol/week: 1.0 standard drink of alcohol     Types: 1 Glasses of wine per week    Drug use: Never    Sexual activity: Yes     Partners: Male     Birth control/protection: None     Social Drivers of Health     Financial Resource Strain: Low Risk  (2024)    Overall Financial Resource Strain (CARDIA)     Difficulty of Paying Living Expenses: Not hard at all   Food Insecurity: No Food Insecurity (2024)    Hunger Vital Sign     Worried About Running Out of Food in the Last Year: Never true     Ran Out of Food in the Last Year: Never true   Physical Activity: Sufficiently Active (2024)    Exercise Vital Sign     Days of Exercise per Week: 4 days     Minutes of Exercise per Session: 40 min   Stress: No Stress Concern Present (2024)    [employer] [place] [employer] - Occupational Stress Questionnaire     Feeling of Stress : Only a little   Housing Stability:  Unknown (5/22/2024)    Housing Stability Vital Sign     Unable to Pay for Housing in the Last Year: No      Family History   Problem Relation Name Age of Onset    Heart attack Mother Pat Cloud     Thyroid disease Mother Pat Cloud     Heart failure Father Rainniel Cavalier     Heart disease Father Rainniel Cloud     Dementia Sister      Stroke Maternal Grandfather Arlington Lopez     Heart attack Paternal Grandmother  50    Stomach cancer Paternal Grandfather      Thyroid cancer Daughter Daylin Andrade 31    Ovarian cancer Daughter Daylin Andrade 21    Genetic Disease Carrier Daughter Daylin Andrade         DYLAN+    Cancer Daughter Daylin Andrade         Ovarian and Thyroid    Genetic Disease Carrier Daughter          DYLAN+    Breast cancer Maternal Cousin  53    Lung cancer Paternal Aunt      Brain cancer Paternal Aunt      Breast cancer Paternal Cousin  60    Leukemia Paternal Cousin  7    Cancer Paternal Cousin      Cancer Paternal Cousin        Review of patient's allergies indicates:   Allergen Reactions    Levaquin [levofloxacin] Anaphylaxis    Morphine Itching and Swelling     Other reaction(s): Angioedema    Penicillanic sulfone bl beta-lactamase inhibitors Anaphylaxis    Penicillins Anaphylaxis      Review of Systems   Constitutional:  Negative for appetite change and unexpected weight change.   HENT:  Negative for mouth sores.    Eyes:  Negative for visual disturbance.   Respiratory:  Negative for cough and shortness of breath.    Cardiovascular:  Negative for chest pain.   Gastrointestinal:  Positive for nausea. Negative for abdominal pain and diarrhea.   Genitourinary:  Negative for frequency.   Musculoskeletal:  Negative for back pain.   Integumentary:  Negative for rash.   Neurological:  Negative for headaches.   Hematological:  Negative for adenopathy.   Psychiatric/Behavioral:  The patient is not nervous/anxious.          Objective:      Physical Exam  Vitals reviewed. Exam conducted with a chaperone present.    Constitutional:       General: She is not in acute distress.     Appearance: Normal appearance.   HENT:      Head: Normocephalic and atraumatic.      Nose: Nose normal.      Mouth/Throat:      Mouth: Mucous membranes are moist.   Eyes:      Extraocular Movements: Extraocular movements intact.      Conjunctiva/sclera: Conjunctivae normal.   Cardiovascular:      Rate and Rhythm: Normal rate and regular rhythm.      Pulses: Normal pulses.      Heart sounds: Normal heart sounds.   Pulmonary:      Effort: Pulmonary effort is normal.      Breath sounds: Normal breath sounds.   Abdominal:      General: Bowel sounds are normal.      Palpations: Abdomen is soft.   Musculoskeletal:         General: No swelling. Normal range of motion.      Cervical back: Normal range of motion and neck supple.      Right lower leg: No edema.      Left lower leg: No edema.   Lymphadenopathy:      Cervical: No cervical adenopathy.   Skin:     General: Skin is warm and dry.   Neurological:      General: No focal deficit present.      Mental Status: She is alert and oriented to person, place, and time. Mental status is at baseline.   Psychiatric:         Mood and Affect: Mood normal.         Behavior: Behavior normal.         LABS AND IMAGING REVIEWED IN EPIC          Assessment:   Eosinophilia with leukocytosis  Hypogammaglobulinemia        Plan:       The patient's bone marrow biopsy returned without evidence of reason for her eosinophilia from a bone marrow perspective. Of note, the patient does see Dr. Alexandra, underwent EGD and colonoscopy in 10/2023, this did not reveal any abnormalities.    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 Alexandra and will also be referred to high-risk breast cancer clinic.    Porphyria workup negative.     Return to clinic in 6 months with CBC, CMP, QIMGs.    Kevin Gillespie II, MD

## 2025-02-24 ENCOUNTER — OFFICE VISIT (OUTPATIENT)
Dept: HEMATOLOGY/ONCOLOGY | Facility: CLINIC | Age: 65
End: 2025-02-24
Payer: COMMERCIAL

## 2025-02-24 VITALS
DIASTOLIC BLOOD PRESSURE: 83 MMHG | HEIGHT: 68 IN | HEART RATE: 79 BPM | BODY MASS INDEX: 21.98 KG/M2 | WEIGHT: 145 LBS | OXYGEN SATURATION: 99 % | RESPIRATION RATE: 18 BRPM | SYSTOLIC BLOOD PRESSURE: 143 MMHG

## 2025-02-24 DIAGNOSIS — D72.10 EOSINOPHILIA, UNSPECIFIED TYPE: Primary | ICD-10-CM

## 2025-02-24 PROCEDURE — 3079F DIAST BP 80-89 MM HG: CPT | Mod: CPTII,S$GLB,, | Performed by: INTERNAL MEDICINE

## 2025-02-24 PROCEDURE — 3077F SYST BP >= 140 MM HG: CPT | Mod: CPTII,S$GLB,, | Performed by: INTERNAL MEDICINE

## 2025-02-24 PROCEDURE — 99213 OFFICE O/P EST LOW 20 MIN: CPT | Mod: S$GLB,,, | Performed by: INTERNAL MEDICINE

## 2025-02-24 PROCEDURE — 3008F BODY MASS INDEX DOCD: CPT | Mod: CPTII,S$GLB,, | Performed by: INTERNAL MEDICINE

## 2025-02-24 PROCEDURE — 99999 PR PBB SHADOW E&M-EST. PATIENT-LVL III: CPT | Mod: PBBFAC,,, | Performed by: INTERNAL MEDICINE

## 2025-02-25 ENCOUNTER — RESULTS FOLLOW-UP (OUTPATIENT)
Dept: SURGERY | Facility: CLINIC | Age: 65
End: 2025-02-25

## 2025-03-14 ENCOUNTER — OFFICE VISIT (OUTPATIENT)
Dept: URGENT CARE | Facility: CLINIC | Age: 65
End: 2025-03-14
Payer: COMMERCIAL

## 2025-03-14 VITALS
SYSTOLIC BLOOD PRESSURE: 113 MMHG | OXYGEN SATURATION: 99 % | RESPIRATION RATE: 20 BRPM | TEMPERATURE: 100 F | WEIGHT: 140 LBS | BODY MASS INDEX: 21.22 KG/M2 | HEART RATE: 115 BPM | HEIGHT: 68 IN | DIASTOLIC BLOOD PRESSURE: 77 MMHG

## 2025-03-14 DIAGNOSIS — J01.90 ACUTE SINUSITIS, RECURRENCE NOT SPECIFIED, UNSPECIFIED LOCATION: ICD-10-CM

## 2025-03-14 DIAGNOSIS — R05.9 COUGH, UNSPECIFIED TYPE: Primary | ICD-10-CM

## 2025-03-14 LAB
CTP QC/QA: YES
CTP QC/QA: YES
POC MOLECULAR INFLUENZA A AGN: NEGATIVE
POC MOLECULAR INFLUENZA B AGN: NEGATIVE
SARS-COV-2 RDRP RESP QL NAA+PROBE: NEGATIVE

## 2025-03-14 RX ORDER — DOXYCYCLINE 100 MG/1
100 CAPSULE ORAL EVERY 12 HOURS
Qty: 14 CAPSULE | Refills: 0 | Status: SHIPPED | OUTPATIENT
Start: 2025-03-14 | End: 2025-03-21

## 2025-03-14 RX ORDER — PROMETHAZINE HYDROCHLORIDE AND DEXTROMETHORPHAN HYDROBROMIDE 6.25; 15 MG/5ML; MG/5ML
5 SYRUP ORAL EVERY 6 HOURS PRN
Qty: 118 ML | Refills: 0 | Status: SHIPPED | OUTPATIENT
Start: 2025-03-14 | End: 2025-03-24

## 2025-03-14 NOTE — PROGRESS NOTES
"Subjective:      Patient ID: Yasmin Andrade is a 64 y.o. female.    Vitals:  height is 5' 8" (1.727 m) and weight is 63.5 kg (140 lb). Her tympanic temperature is 99.9 °F (37.7 °C). Her blood pressure is 113/77 and her pulse is 115 (abnormal). Her respiration is 20 and oxygen saturation is 99%.     Chief Complaint: Nasal Congestion (Cough,fever, nasal congestion, sore throat - Entered by patient)     Patient is a 64 y.o. female who presents to urgent care with complaints of cough, congestion, frontal ha, feverish  x4 days.  Patient is without relief from over-the-counter remedies.  She reports progressively worsening sinus pressure over the last 2-3 days with her subjective fever.  Patient had cataract surgery a few days ago.  They advised her to not use any corticosteroid type medication.  Patient denies V/D sore throat.      ROS   Objective:     Physical Exam   Constitutional: She is oriented to person, place, and time. She appears well-developed. She is cooperative.  Non-toxic appearance. She does not appear ill. No distress.   HENT:   Head: Normocephalic and atraumatic.   Ears:   Right Ear: Hearing, tympanic membrane, external ear and ear canal normal.   Left Ear: Hearing, tympanic membrane, external ear and ear canal normal.   Nose: Nose normal. No nasal deformity. No epistaxis.   Mouth/Throat: Uvula is midline, oropharynx is clear and moist and mucous membranes are normal. No trismus in the jaw. Normal dentition. No uvula swelling. No oropharyngeal exudate, posterior oropharyngeal edema or posterior oropharyngeal erythema.   Eyes: Conjunctivae and lids are normal. No scleral icterus.   Neck: Trachea normal and phonation normal. Neck supple. No edema present. No erythema present. No neck rigidity present.   Cardiovascular: Normal rate, regular rhythm, normal heart sounds and normal pulses.   Pulmonary/Chest: Effort normal and breath sounds normal. No respiratory distress. She has no decreased breath " sounds. She has no rhonchi.   Abdominal: Normal appearance.   Musculoskeletal: Normal range of motion.         General: No deformity. Normal range of motion.   Neurological: She is alert and oriented to person, place, and time. She exhibits normal muscle tone. Coordination normal.   Skin: Skin is warm, dry, intact, not diaphoretic and not pale.   Psychiatric: Her speech is normal and behavior is normal. Judgment and thought content normal.   Nursing note and vitals reviewed.       Previous History      Review of patient's allergies indicates:   Allergen Reactions    Levaquin [levofloxacin] Anaphylaxis    Morphine Itching and Swelling     Other reaction(s): Angioedema    Penicillanic sulfone bl beta-lactamase inhibitors Anaphylaxis    Penicillins Anaphylaxis       Past Medical History:   Diagnosis Date    Allergy     Asthma     DYLAN gene mutation positive     Dysphagia     Hypothyroidism     Kidney stone     Menopause     Migraine     Pancreatic cyst     Pregnancy 1985     Current Outpatient Medications   Medication Instructions    ALLERGY RELIEF (FEXOFENADINE) 180 mg, Every morning    BREO ELLIPTA 200-25 mcg/dose DsDv diskus inhaler INHALE 1 PUFF BY MOUTH ONCE A DAY    doxycycline (MONODOX) 100 mg, Oral, Every 12 hours    FASENRA PEN 30 mg, Subcutaneous, Every 8 weeks    ipratropium (ATROVENT) 21 mcg (0.03 %) nasal spray SPRAY 2 SPRAY(S) INTRANASALLY 3 TIMES A DAY    levothyroxine (SYNTHROID) 50 mcg, Daily    montelukast (SINGULAIR) 10 mg, Daily    nitroGLYCERIN (NITROSTAT) 0.4 MG SL tablet 25    ondansetron (ZOFRAN-ODT) 4 mg, 3 times daily    ondansetron (ZOFRAN-ODT) 4 mg, Oral, Every 8 hours PRN    promethazine (PHENERGAN) 12.5 mg    promethazine-dextromethorphan (PROMETHAZINE-DM) 6.25-15 mg/5 mL Syrp 5 mLs, Oral, Every 6 hours PRN    SPIRIVA RESPIMAT 1.25 mcg/actuation inhaler INHALE TWO PUFFS ONCE A DAY    tolterodine (DETROL LA) 4 MG 24 hr capsule 90    topiramate (TOPAMAX) 50 mg, Daily     Past Surgical  "History:   Procedure Laterality Date    APPENDECTOMY      BONE MARROW BIOPSY N/A 2023    Procedure: Biopsy-bone marrow;  Surgeon: Imer Toney MD;  Location: [facility] OR;  Service: General;  Laterality: N/A;     SECTION      CHOLECYSTECTOMY  2024    EGD, WITH BALLOON DILATION      LAPAROSCOPIC CHOLECYSTECTOMY N/A 2024    Procedure: CHOLECYSTECTOMY, LAPAROSCOPIC;  Surgeon: Jeremiah Dixon MD;  Location: [facility] OR;  Service: General;  Laterality: N/A;    LITHOTRIPSY  10/2017    right shoulder sx      sinus sx      TONSILLECTOMY      TUBAL LIGATION       Family History   Problem Relation Name Age of Onset    Heart attack Mother Pat Cloud     Thyroid disease Mother Pat Cloud     Heart failure Father Rainniel Lafourche     Heart disease Father Rainniel Cloud     Dementia Sister      Stroke Maternal Grandfather Chichester Lopez     Heart attack Paternal Grandmother  50    Stomach cancer Paternal Grandfather      Thyroid cancer Daughter Daylin Andrade 31    Ovarian cancer Daughter Daylin Andrade 21    Genetic Disease Carrier Daughter Daylin Andrade         DYLAN+    Cancer Daughter Daylin Andrade         Ovarian and Thyroid    Genetic Disease Carrier Daughter          DYLAN+    Breast cancer Maternal Cousin  53    Lung cancer Paternal Aunt      Brain cancer Paternal Aunt      Breast cancer Paternal Cousin  60    Leukemia Paternal Cousin  7    Cancer Paternal Cousin      Cancer Paternal Cousin         Social History[1]     Physical Exam      Vital Signs Reviewed   /77   Pulse (!) 115   Temp 99.9 °F (37.7 °C) (Tympanic)   Resp 20   Ht 5' 8" (1.727 m)   Wt 63.5 kg (140 lb)   SpO2 99%   BMI 21.29 kg/m²        Procedures    Procedures     Labs     Results for orders placed or performed in visit on 25   POCT Influenza A/B Molecular    Collection Time: 25 12:06 PM   Result Value Ref Range    POC Molecular Influenza A Ag Negative Negative    POC Molecular Influenza B Ag Negative Negative    "  Acceptable Yes    POCT COVID-19 Rapid Screening    Collection Time: 03/14/25 12:06 PM   Result Value Ref Range    POC Rapid COVID Negative Negative     Acceptable Yes        Assessment:     1. Cough, unspecified type    2. Acute sinusitis, recurrence not specified, unspecified location        Plan:       Cough, unspecified type  -     POCT Influenza A/B Molecular  -     POCT COVID-19 Rapid Screening    Acute sinusitis, recurrence not specified, unspecified location    Other orders  -     promethazine-dextromethorphan (PROMETHAZINE-DM) 6.25-15 mg/5 mL Syrp; Take 5 mLs by mouth every 6 (six) hours as needed.  Dispense: 118 mL; Refill: 0  -     doxycycline (MONODOX) 100 MG capsule; Take 1 capsule (100 mg total) by mouth every 12 (twelve) hours. for 7 days  Dispense: 14 capsule; Refill: 0      Drink plenty of fluids.     Get plenty of rest.     Tylenol  as needed.     Go to the ER with any significant change or worsening of symptoms.     Follow up with your primary care doctor.                      [1]   Social History  Tobacco Use    Smoking status: Never    Smokeless tobacco: Never   Substance Use Topics    Alcohol use: Yes     Alcohol/week: 1.0 standard drink of alcohol     Types: 1 Glasses of wine per week    Drug use: Never

## 2025-03-14 NOTE — PATIENT INSTRUCTIONS
Drink plenty of fluids.     Get plenty of rest.     Tylenol  as needed.     Go to the ER with any significant change or worsening of symptoms.     Follow up with your primary care doctor.

## 2025-07-04 ENCOUNTER — RESULTS FOLLOW-UP (OUTPATIENT)
Dept: URGENT CARE | Facility: CLINIC | Age: 65
End: 2025-07-04

## 2025-07-04 ENCOUNTER — OFFICE VISIT (OUTPATIENT)
Dept: URGENT CARE | Facility: CLINIC | Age: 65
End: 2025-07-04
Payer: COMMERCIAL

## 2025-07-04 VITALS
SYSTOLIC BLOOD PRESSURE: 116 MMHG | RESPIRATION RATE: 20 BRPM | HEIGHT: 68 IN | OXYGEN SATURATION: 98 % | DIASTOLIC BLOOD PRESSURE: 79 MMHG | WEIGHT: 140 LBS | BODY MASS INDEX: 21.22 KG/M2 | HEART RATE: 87 BPM | TEMPERATURE: 98 F

## 2025-07-04 DIAGNOSIS — R07.81 RIB PAIN: Primary | ICD-10-CM

## 2025-07-04 RX ORDER — KETOROLAC TROMETHAMINE 30 MG/ML
60 INJECTION, SOLUTION INTRAMUSCULAR; INTRAVENOUS
Status: COMPLETED | OUTPATIENT
Start: 2025-07-04 | End: 2025-07-04

## 2025-07-04 RX ORDER — NAPROXEN 500 MG/1
500 TABLET ORAL 2 TIMES DAILY
Qty: 14 TABLET | Refills: 0 | Status: SHIPPED | OUTPATIENT
Start: 2025-07-04 | End: 2025-07-11

## 2025-07-04 RX ORDER — METHOCARBAMOL 500 MG/1
1000 TABLET, FILM COATED ORAL 4 TIMES DAILY
Qty: 24 TABLET | Refills: 0 | Status: SHIPPED | OUTPATIENT
Start: 2025-07-04 | End: 2025-07-07

## 2025-07-04 RX ADMIN — KETOROLAC TROMETHAMINE 60 MG: 30 INJECTION, SOLUTION INTRAMUSCULAR; INTRAVENOUS at 12:07

## 2025-07-04 NOTE — PROGRESS NOTES
"Subjective:      Patient ID: Yasmin Andrade is a 64 y.o. female.    Vitals:  height is 5' 8" (1.727 m) and weight is 63.5 kg (140 lb). Her tympanic temperature is 97.9 °F (36.6 °C). Her blood pressure is 116/79 and her pulse is 87. Her respiration is 20 and oxygen saturation is 98%.     Chief Complaint: Fall (Hurt left side by ribs)     Patient is a 64 y.o. female who presents to urgent care with complaints of fell and hit on left side underarm, hurts to breathe, walk and can't lift left arm, back pain x3 days. Alleviating factors include tylenol with no relief. Patient denies N/v/D HA BA.  Pain is reproducible with palpation and movement of the left arm        Musculoskeletal:  Positive for trauma.      Objective:     Physical Exam   Constitutional: She is oriented to person, place, and time. She appears well-developed. She is cooperative.  Non-toxic appearance. She does not appear ill. No distress.   HENT:   Head: Normocephalic and atraumatic.   Ears:   Right Ear: Hearing, tympanic membrane, external ear and ear canal normal.   Left Ear: Hearing, tympanic membrane, external ear and ear canal normal.   Nose: Nose normal. No mucosal edema, rhinorrhea or nasal deformity. No epistaxis. Right sinus exhibits no maxillary sinus tenderness and no frontal sinus tenderness. Left sinus exhibits no maxillary sinus tenderness and no frontal sinus tenderness.   Mouth/Throat: Uvula is midline, oropharynx is clear and moist and mucous membranes are normal. No trismus in the jaw. Normal dentition. No uvula swelling. No oropharyngeal exudate, posterior oropharyngeal edema or posterior oropharyngeal erythema.   Eyes: Conjunctivae and lids are normal. No scleral icterus.   Neck: Trachea normal and phonation normal. Neck supple. No edema present. No erythema present. No neck rigidity present.   Cardiovascular: Normal rate, regular rhythm, normal heart sounds and normal pulses.   Pulmonary/Chest: Effort normal and breath sounds " normal. No respiratory distress. She has no decreased breath sounds. She has no rhonchi.   Abdominal: Normal appearance.   Musculoskeletal: Normal range of motion.         General: No deformity. Normal range of motion.      Comments: Tenderness over left posterior rib area.  There is no T-spine tenderness.  Patient has no bruising or discoloration noted.   Neurological: She is alert and oriented to person, place, and time. She exhibits normal muscle tone. Coordination normal.   Skin: Skin is warm, dry, intact, not diaphoretic and not pale.   Psychiatric: Her speech is normal and behavior is normal. Judgment and thought content normal.   Nursing note and vitals reviewed.         Previous History      Review of patient's allergies indicates:   Allergen Reactions    Levaquin [levofloxacin] Anaphylaxis    Morphine Itching and Swelling     Other reaction(s): Angioedema    Penicillanic sulfone bl beta-lactamase inhibitors Anaphylaxis    Penicillins Anaphylaxis       Past Medical History:   Diagnosis Date    Allergy     Asthma     DYLAN gene mutation positive     Dysphagia     Hypothyroidism     Kidney stone     Menopause     Migraine     Pancreatic cyst     Pregnancy 1985     Current Outpatient Medications   Medication Instructions    ALLERGY RELIEF (FEXOFENADINE) 180 mg, Every morning    BREO ELLIPTA 200-25 mcg/dose DsDv diskus inhaler INHALE 1 PUFF BY MOUTH ONCE A DAY    FASENRA PEN 30 mg, Subcutaneous, Every 8 weeks    ipratropium (ATROVENT) 21 mcg (0.03 %) nasal spray SPRAY 2 SPRAY(S) INTRANASALLY 3 TIMES A DAY    levothyroxine (SYNTHROID) 50 mcg, Daily    methocarbamoL (ROBAXIN) 1,000 mg, Oral, 4 times daily    montelukast (SINGULAIR) 10 mg, Daily    naproxen (NAPROSYN) 500 mg, Oral, 2 times daily    nitroGLYCERIN (NITROSTAT) 0.4 MG SL tablet 25    ondansetron (ZOFRAN-ODT) 4 mg, 3 times daily    ondansetron (ZOFRAN-ODT) 4 mg, Oral, Every 8 hours PRN    promethazine (PHENERGAN) 12.5 mg    SPIRIVA RESPIMAT 1.25  "mcg/actuation inhaler INHALE TWO PUFFS ONCE A DAY    tolterodine (DETROL LA) 4 MG 24 hr capsule 90    topiramate (TOPAMAX) 50 mg, Daily     Past Surgical History:   Procedure Laterality Date    APPENDECTOMY      BONE MARROW BIOPSY N/A 2023    Procedure: Biopsy-bone marrow;  Surgeon: Imer Toney MD;  Location: [facility] OR;  Service: General;  Laterality: N/A;     SECTION      CHOLECYSTECTOMY  2024    EGD, WITH BALLOON DILATION      LAPAROSCOPIC CHOLECYSTECTOMY N/A 2024    Procedure: CHOLECYSTECTOMY, LAPAROSCOPIC;  Surgeon: Jeremiah Dixon MD;  Location: [facility] OR;  Service: General;  Laterality: N/A;    LITHOTRIPSY  10/2017    right shoulder sx      sinus sx      TONSILLECTOMY      TUBAL LIGATION       Family History   Problem Relation Name Age of Onset    Heart attack Mother Pat Cloud     Thyroid disease Mother Pat Cloud     Heart failure Father Rainniel Wilkinson     Heart disease Father Rainniel Cloud     Dementia Sister      Stroke Maternal Grandfather Wall Lopez     Heart attack Paternal Grandmother  50    Stomach cancer Paternal Grandfather      Thyroid cancer Daughter Daylin Andrade 31    Ovarian cancer Daughter Daylin Andrade 21    Genetic Disease Carrier Daughter Daylin Andrade         DYLAN+    Cancer Daughter Daylin Andrade         Ovarian and Thyroid    Genetic Disease Carrier Daughter          DYLAN+    Breast cancer Maternal Cousin  53    Lung cancer Paternal Aunt      Brain cancer Paternal Aunt      Breast cancer Paternal Cousin  60    Leukemia Paternal Cousin  7    Cancer Paternal Cousin      Cancer Paternal Cousin         Social History[1]     Physical Exam      Vital Signs Reviewed   /79   Pulse 87   Temp 97.9 °F (36.6 °C) (Tympanic)   Resp 20   Ht 5' 8" (1.727 m)   Wt 63.5 kg (140 lb)   SpO2 98%   BMI 21.29 kg/m²        Procedures    Procedures     Labs     Results for orders placed or performed in visit on 25   POCT Influenza A/B Molecular    Collection Time: " 03/14/25 12:06 PM   Result Value Ref Range    POC Molecular Influenza A Ag Negative Negative    POC Molecular Influenza B Ag Negative Negative     Acceptable Yes    POCT COVID-19 Rapid Screening    Collection Time: 03/14/25 12:06 PM   Result Value Ref Range    POC Rapid COVID Negative Negative     Acceptable Yes       Assessment:     1. Rib pain      I do not appreciate any obvious abnormalities of the rib x-ray.  Plan:   Presents to the emergency room for any worsening symptoms.  Follow up with primary care provider next week for further evaluation of this problem.    Rib pain  -     XR RIB LEFT W/ PA CHEST; Future; Expected date: 07/04/2025  -     ketorolac injection 60 mg  -     naproxen (NAPROSYN) 500 MG tablet; Take 1 tablet (500 mg total) by mouth 2 (two) times daily. for 7 days  Dispense: 14 tablet; Refill: 0  -     methocarbamoL (ROBAXIN) 500 MG Tab; Take 2 tablets (1,000 mg total) by mouth 4 (four) times daily. for 3 days  Dispense: 24 tablet; Refill: 0                         [1]   Social History  Tobacco Use    Smoking status: Never    Smokeless tobacco: Never   Substance Use Topics    Alcohol use: Yes     Alcohol/week: 1.0 standard drink of alcohol     Types: 1 Glasses of wine per week    Drug use: Never

## 2025-08-18 ENCOUNTER — LAB VISIT (OUTPATIENT)
Dept: LAB | Facility: HOSPITAL | Age: 65
End: 2025-08-18
Attending: INTERNAL MEDICINE
Payer: COMMERCIAL

## 2025-08-18 DIAGNOSIS — D72.10 EOSINOPHILIA, UNSPECIFIED TYPE: ICD-10-CM

## 2025-08-18 LAB
ALBUMIN SERPL-MCNC: 4.1 G/DL (ref 3.4–4.8)
ALBUMIN/GLOB SERPL: 1.4 RATIO (ref 1.1–2)
ALP SERPL-CCNC: 80 UNIT/L (ref 40–150)
ALT SERPL-CCNC: 18 UNIT/L (ref 0–55)
ANION GAP SERPL CALC-SCNC: 6 MEQ/L
AST SERPL-CCNC: 16 UNIT/L (ref 11–45)
BASOPHILS # BLD AUTO: 0.03 X10(3)/MCL
BASOPHILS NFR BLD AUTO: 0.5 %
BILIRUB SERPL-MCNC: 0.4 MG/DL
BUN SERPL-MCNC: 11.7 MG/DL (ref 9.8–20.1)
CALCIUM SERPL-MCNC: 9.5 MG/DL (ref 8.4–10.2)
CHLORIDE SERPL-SCNC: 105 MMOL/L (ref 98–107)
CO2 SERPL-SCNC: 28 MMOL/L (ref 23–31)
CREAT SERPL-MCNC: 0.81 MG/DL (ref 0.55–1.02)
CREAT/UREA NIT SERPL: 14
EOSINOPHIL # BLD AUTO: 0 X10(3)/MCL (ref 0–0.9)
EOSINOPHIL NFR BLD AUTO: 0 %
ERYTHROCYTE [DISTWIDTH] IN BLOOD BY AUTOMATED COUNT: 12.4 % (ref 11.5–17)
GFR SERPLBLD CREATININE-BSD FMLA CKD-EPI: >60 ML/MIN/1.73/M2
GLOBULIN SER-MCNC: 2.9 GM/DL (ref 2.4–3.5)
GLUCOSE SERPL-MCNC: 84 MG/DL (ref 82–115)
HCT VFR BLD AUTO: 37.2 % (ref 37–47)
HGB BLD-MCNC: 12.6 G/DL (ref 12–16)
IGA SERPL-MCNC: 183 MG/DL (ref 69–517)
IGG SERPL-MCNC: 548 MG/DL (ref 522–1631)
IGM SERPL-MCNC: 134 MG/DL (ref 33–293)
IMM GRANULOCYTES # BLD AUTO: 0.01 X10(3)/MCL (ref 0–0.04)
IMM GRANULOCYTES NFR BLD AUTO: 0.2 %
LYMPHOCYTES # BLD AUTO: 2.18 X10(3)/MCL (ref 0.6–4.6)
LYMPHOCYTES NFR BLD AUTO: 36.3 %
MCH RBC QN AUTO: 31.3 PG (ref 27–31)
MCHC RBC AUTO-ENTMCNC: 33.9 G/DL (ref 33–36)
MCV RBC AUTO: 92.3 FL (ref 80–94)
MONOCYTES # BLD AUTO: 0.49 X10(3)/MCL (ref 0.1–1.3)
MONOCYTES NFR BLD AUTO: 8.2 %
NEUTROPHILS # BLD AUTO: 3.29 X10(3)/MCL (ref 2.1–9.2)
NEUTROPHILS NFR BLD AUTO: 54.8 %
PLATELET # BLD AUTO: 325 X10(3)/MCL (ref 130–400)
PMV BLD AUTO: 8.5 FL (ref 7.4–10.4)
POTASSIUM SERPL-SCNC: 4.1 MMOL/L (ref 3.5–5.1)
PROT SERPL-MCNC: 7 GM/DL (ref 5.8–7.6)
RBC # BLD AUTO: 4.03 X10(6)/MCL (ref 4.2–5.4)
SODIUM SERPL-SCNC: 139 MMOL/L (ref 136–145)
WBC # BLD AUTO: 6 X10(3)/MCL (ref 4.5–11.5)

## 2025-08-18 PROCEDURE — 85025 COMPLETE CBC W/AUTO DIFF WBC: CPT

## 2025-08-18 PROCEDURE — 82784 ASSAY IGA/IGD/IGG/IGM EACH: CPT | Mod: 59

## 2025-08-18 PROCEDURE — 80053 COMPREHEN METABOLIC PANEL: CPT

## 2025-08-18 PROCEDURE — 36415 COLL VENOUS BLD VENIPUNCTURE: CPT

## 2025-08-25 ENCOUNTER — OFFICE VISIT (OUTPATIENT)
Dept: HEMATOLOGY/ONCOLOGY | Facility: CLINIC | Age: 65
End: 2025-08-25
Payer: COMMERCIAL

## 2025-08-25 VITALS
RESPIRATION RATE: 18 BRPM | SYSTOLIC BLOOD PRESSURE: 121 MMHG | BODY MASS INDEX: 22.05 KG/M2 | HEIGHT: 68 IN | DIASTOLIC BLOOD PRESSURE: 74 MMHG | HEART RATE: 87 BPM | WEIGHT: 145.5 LBS | OXYGEN SATURATION: 100 %

## 2025-08-25 DIAGNOSIS — D72.10 EOSINOPHILIA, UNSPECIFIED TYPE: Primary | ICD-10-CM

## 2025-08-25 DIAGNOSIS — D80.1 HYPOGAMMAGLOBULINEMIA: ICD-10-CM

## 2025-08-25 PROBLEM — D80.0: Status: ACTIVE | Noted: 2025-08-25

## 2025-08-25 PROCEDURE — 99999 PR PBB SHADOW E&M-EST. PATIENT-LVL IV: CPT | Mod: PBBFAC,,, | Performed by: NURSE PRACTITIONER
